# Patient Record
Sex: FEMALE | Race: BLACK OR AFRICAN AMERICAN | NOT HISPANIC OR LATINO | Employment: FULL TIME | URBAN - METROPOLITAN AREA
[De-identification: names, ages, dates, MRNs, and addresses within clinical notes are randomized per-mention and may not be internally consistent; named-entity substitution may affect disease eponyms.]

---

## 2017-01-04 PROCEDURE — 0KQM0ZZ REPAIR PERINEUM MUSCLE, OPEN APPROACH: ICD-10-PCS | Performed by: OBSTETRICS & GYNECOLOGY

## 2017-01-14 ENCOUNTER — HOSPITAL ENCOUNTER (INPATIENT)
Facility: HOSPITAL | Age: 22
LOS: 2 days | Discharge: HOME | End: 2017-01-16
Attending: OBSTETRICS & GYNECOLOGY | Admitting: OBSTETRICS & GYNECOLOGY
Payer: MEDICAID

## 2017-01-14 ENCOUNTER — ANESTHESIA (OUTPATIENT)
Dept: OBSTETRICS AND GYNECOLOGY | Facility: HOSPITAL | Age: 22
End: 2017-01-14
Payer: MEDICAID

## 2017-01-14 ENCOUNTER — ANESTHESIA EVENT (OUTPATIENT)
Dept: OBSTETRICS AND GYNECOLOGY | Facility: HOSPITAL | Age: 22
End: 2017-01-14
Payer: MEDICAID

## 2017-01-14 DIAGNOSIS — O47.9: ICD-10-CM

## 2017-01-14 DIAGNOSIS — Z34.93 PREGNANCY WITH ONE FETUS, THIRD TRIMESTER: Primary | ICD-10-CM

## 2017-01-14 LAB
ABO + RH BLD: NORMAL
ALBUMIN SERPL BCP-MCNC: 2.3 G/DL
ALP SERPL-CCNC: 220 U/L
ALT SERPL W/O P-5'-P-CCNC: 13 U/L
ANION GAP SERPL CALC-SCNC: 8 MMOL/L
ANISOCYTOSIS BLD QL SMEAR: SLIGHT
AST SERPL-CCNC: 11 U/L
BASOPHILS # BLD AUTO: 0.01 K/UL
BASOPHILS NFR BLD: 0.1 %
BILIRUB SERPL-MCNC: 0.2 MG/DL
BLD GP AB SCN CELLS X3 SERPL QL: NORMAL
BUN SERPL-MCNC: 4 MG/DL
CALCIUM SERPL-MCNC: 8.7 MG/DL
CHLORIDE SERPL-SCNC: 108 MMOL/L
CO2 SERPL-SCNC: 20 MMOL/L
CREAT SERPL-MCNC: 0.6 MG/DL
CREAT UR-MCNC: 40.5 MG/DL
DIFFERENTIAL METHOD: ABNORMAL
EOSINOPHIL # BLD AUTO: 0 K/UL
EOSINOPHIL NFR BLD: 0.2 %
ERYTHROCYTE [DISTWIDTH] IN BLOOD BY AUTOMATED COUNT: 19.4 %
EST. GFR  (AFRICAN AMERICAN): >60 ML/MIN/1.73 M^2
EST. GFR  (NON AFRICAN AMERICAN): >60 ML/MIN/1.73 M^2
GLUCOSE SERPL-MCNC: 91 MG/DL
HCT VFR BLD AUTO: 30.8 %
HGB BLD-MCNC: 9.5 G/DL
HYPOCHROMIA BLD QL SMEAR: ABNORMAL
LYMPHOCYTES # BLD AUTO: 1.4 K/UL
LYMPHOCYTES NFR BLD: 12.4 %
MCH RBC QN AUTO: 19.8 PG
MCHC RBC AUTO-ENTMCNC: 30.8 %
MCV RBC AUTO: 64 FL
MONOCYTES # BLD AUTO: 0.3 K/UL
MONOCYTES NFR BLD: 2.3 %
NEUTROPHILS # BLD AUTO: 9.8 K/UL
NEUTROPHILS NFR BLD: 85.3 %
OVALOCYTES BLD QL SMEAR: ABNORMAL
PLATELET # BLD AUTO: 229 K/UL
PMV BLD AUTO: ABNORMAL FL
POIKILOCYTOSIS BLD QL SMEAR: SLIGHT
POTASSIUM SERPL-SCNC: 3.7 MMOL/L
PROT SERPL-MCNC: 6.5 G/DL
PROT UR-MCNC: 19 MG/DL
PROT/CREAT RATIO, UR: 0.47
RBC # BLD AUTO: 4.79 M/UL
SODIUM SERPL-SCNC: 136 MMOL/L
WBC # BLD AUTO: 11.53 K/UL

## 2017-01-14 PROCEDURE — 59409 OBSTETRICAL CARE: CPT | Mod: AA,,, | Performed by: ANESTHESIOLOGY

## 2017-01-14 PROCEDURE — 25000003 PHARM REV CODE 250: Performed by: OBSTETRICS & GYNECOLOGY

## 2017-01-14 PROCEDURE — 86592 SYPHILIS TEST NON-TREP QUAL: CPT

## 2017-01-14 PROCEDURE — 11000001 HC ACUTE MED/SURG PRIVATE ROOM

## 2017-01-14 PROCEDURE — 51702 INSERT TEMP BLADDER CATH: CPT

## 2017-01-14 PROCEDURE — 82570 ASSAY OF URINE CREATININE: CPT

## 2017-01-14 PROCEDURE — 63600175 PHARM REV CODE 636 W HCPCS: Performed by: OBSTETRICS & GYNECOLOGY

## 2017-01-14 PROCEDURE — 63600175 PHARM REV CODE 636 W HCPCS: Performed by: ANESTHESIOLOGY

## 2017-01-14 PROCEDURE — 72200004 HC VAGINAL DELIVERY LEVEL I

## 2017-01-14 PROCEDURE — 25000003 PHARM REV CODE 250: Performed by: ANESTHESIOLOGY

## 2017-01-14 PROCEDURE — 27800517 HC TRAY,EPIDURAL-CONTINUOUS: Performed by: ANESTHESIOLOGY

## 2017-01-14 PROCEDURE — 86900 BLOOD TYPING SEROLOGIC ABO: CPT

## 2017-01-14 PROCEDURE — 27200710 HC EPIDURAL INFUSION PUMP SET: Performed by: ANESTHESIOLOGY

## 2017-01-14 PROCEDURE — 63600175 PHARM REV CODE 636 W HCPCS

## 2017-01-14 PROCEDURE — 86901 BLOOD TYPING SEROLOGIC RH(D): CPT

## 2017-01-14 PROCEDURE — 72100002 HC LABOR CARE, 1ST 8 HOURS

## 2017-01-14 PROCEDURE — 80053 COMPREHEN METABOLIC PANEL: CPT

## 2017-01-14 PROCEDURE — 85025 COMPLETE CBC W/AUTO DIFF WBC: CPT

## 2017-01-14 PROCEDURE — 62326 NJX INTERLAMINAR LMBR/SAC: CPT | Performed by: ANESTHESIOLOGY

## 2017-01-14 RX ORDER — OXYCODONE AND ACETAMINOPHEN 5; 325 MG/1; MG/1
1 TABLET ORAL EVERY 4 HOURS PRN
Status: DISCONTINUED | OUTPATIENT
Start: 2017-01-14 | End: 2017-01-16 | Stop reason: HOSPADM

## 2017-01-14 RX ORDER — FENTANYL CITRATE 50 UG/ML
INJECTION, SOLUTION INTRAMUSCULAR; INTRAVENOUS
Status: DISCONTINUED | OUTPATIENT
Start: 2017-01-14 | End: 2017-01-14

## 2017-01-14 RX ORDER — OXYTOCIN/RINGER'S LACTATE 20/1000 ML
34 PLASTIC BAG, INJECTION (ML) INTRAVENOUS CONTINUOUS
Status: DISCONTINUED | OUTPATIENT
Start: 2017-01-14 | End: 2017-01-14

## 2017-01-14 RX ORDER — BUTORPHANOL TARTRATE 2 MG/ML
1 INJECTION INTRAMUSCULAR; INTRAVENOUS EVERY 4 HOURS PRN
Status: DISCONTINUED | OUTPATIENT
Start: 2017-01-14 | End: 2017-01-14

## 2017-01-14 RX ORDER — OXYCODONE AND ACETAMINOPHEN 10; 325 MG/1; MG/1
1 TABLET ORAL EVERY 4 HOURS PRN
Status: DISCONTINUED | OUTPATIENT
Start: 2017-01-14 | End: 2017-01-16 | Stop reason: HOSPADM

## 2017-01-14 RX ORDER — FENTANYL/BUPIVACAINE/NS/PF 2MCG/ML-.1
PLASTIC BAG, INJECTION (ML) INJECTION CONTINUOUS
Status: DISCONTINUED | OUTPATIENT
Start: 2017-01-14 | End: 2017-01-14

## 2017-01-14 RX ORDER — METOCLOPRAMIDE HYDROCHLORIDE 5 MG/ML
10 INJECTION INTRAMUSCULAR; INTRAVENOUS ONCE
Status: DISCONTINUED | OUTPATIENT
Start: 2017-01-14 | End: 2017-01-14

## 2017-01-14 RX ORDER — BUPIVACAINE HYDROCHLORIDE 2.5 MG/ML
INJECTION, SOLUTION EPIDURAL; INFILTRATION; INTRACAUDAL
Status: DISCONTINUED | OUTPATIENT
Start: 2017-01-14 | End: 2017-01-14

## 2017-01-14 RX ORDER — MISOPROSTOL 200 UG/1
600 TABLET ORAL
Status: DISCONTINUED | OUTPATIENT
Start: 2017-01-14 | End: 2017-01-16 | Stop reason: HOSPADM

## 2017-01-14 RX ORDER — AMOXICILLIN 250 MG
1 CAPSULE ORAL NIGHTLY
Status: DISCONTINUED | OUTPATIENT
Start: 2017-01-14 | End: 2017-01-16 | Stop reason: HOSPADM

## 2017-01-14 RX ORDER — OXYTOCIN/RINGER'S LACTATE 20/1000 ML
41.65 PLASTIC BAG, INJECTION (ML) INTRAVENOUS CONTINUOUS
Status: DISPENSED | OUTPATIENT
Start: 2017-01-14 | End: 2017-01-15

## 2017-01-14 RX ORDER — MISOPROSTOL 200 UG/1
400 TABLET ORAL EVERY 4 HOURS PRN
Status: DISCONTINUED | OUTPATIENT
Start: 2017-01-14 | End: 2017-01-16 | Stop reason: HOSPADM

## 2017-01-14 RX ORDER — SODIUM CITRATE AND CITRIC ACID MONOHYDRATE 334; 500 MG/5ML; MG/5ML
30 SOLUTION ORAL ONCE
Status: DISCONTINUED | OUTPATIENT
Start: 2017-01-14 | End: 2017-01-14

## 2017-01-14 RX ORDER — ONDANSETRON 8 MG/1
8 TABLET, ORALLY DISINTEGRATING ORAL EVERY 8 HOURS PRN
Status: DISCONTINUED | OUTPATIENT
Start: 2017-01-14 | End: 2017-01-16 | Stop reason: HOSPADM

## 2017-01-14 RX ORDER — CEFAZOLIN SODIUM 1 G/3ML
2 INJECTION, POWDER, FOR SOLUTION INTRAMUSCULAR; INTRAVENOUS
Status: DISCONTINUED | OUTPATIENT
Start: 2017-01-14 | End: 2017-01-14 | Stop reason: CLARIF

## 2017-01-14 RX ORDER — DOCUSATE SODIUM 100 MG/1
200 CAPSULE, LIQUID FILLED ORAL 2 TIMES DAILY PRN
Status: DISCONTINUED | OUTPATIENT
Start: 2017-01-14 | End: 2017-01-16 | Stop reason: HOSPADM

## 2017-01-14 RX ORDER — ZOLPIDEM TARTRATE 5 MG/1
5 TABLET ORAL NIGHTLY PRN
Status: DISCONTINUED | OUTPATIENT
Start: 2017-01-14 | End: 2017-01-16 | Stop reason: HOSPADM

## 2017-01-14 RX ORDER — OXYTOCIN 10 [USP'U]/ML
INJECTION, SOLUTION INTRAMUSCULAR; INTRAVENOUS
Status: COMPLETED
Start: 2017-01-14 | End: 2017-01-14

## 2017-01-14 RX ORDER — HYDROCORTISONE 25 MG/G
CREAM TOPICAL 3 TIMES DAILY PRN
Status: DISCONTINUED | OUTPATIENT
Start: 2017-01-14 | End: 2017-01-16 | Stop reason: HOSPADM

## 2017-01-14 RX ORDER — SIMETHICONE 80 MG
1 TABLET,CHEWABLE ORAL EVERY 6 HOURS PRN
Status: DISCONTINUED | OUTPATIENT
Start: 2017-01-14 | End: 2017-01-16 | Stop reason: HOSPADM

## 2017-01-14 RX ORDER — OXYTOCIN 10 [USP'U]/ML
20 INJECTION, SOLUTION INTRAMUSCULAR; INTRAVENOUS ONCE
Status: COMPLETED | OUTPATIENT
Start: 2017-01-14 | End: 2017-01-14

## 2017-01-14 RX ORDER — DIPHENHYDRAMINE HCL 25 MG
25 CAPSULE ORAL EVERY 4 HOURS PRN
Status: DISCONTINUED | OUTPATIENT
Start: 2017-01-14 | End: 2017-01-16 | Stop reason: HOSPADM

## 2017-01-14 RX ORDER — CEFAZOLIN SODIUM 2 G/50ML
2 SOLUTION INTRAVENOUS
Status: DISCONTINUED | OUTPATIENT
Start: 2017-01-14 | End: 2017-01-14

## 2017-01-14 RX ORDER — IBUPROFEN 600 MG/1
600 TABLET ORAL EVERY 6 HOURS
Status: DISCONTINUED | OUTPATIENT
Start: 2017-01-14 | End: 2017-01-16 | Stop reason: HOSPADM

## 2017-01-14 RX ORDER — SODIUM CHLORIDE, SODIUM LACTATE, POTASSIUM CHLORIDE, CALCIUM CHLORIDE 600; 310; 30; 20 MG/100ML; MG/100ML; MG/100ML; MG/100ML
INJECTION, SOLUTION INTRAVENOUS CONTINUOUS
Status: DISCONTINUED | OUTPATIENT
Start: 2017-01-14 | End: 2017-01-14

## 2017-01-14 RX ORDER — FAMOTIDINE 10 MG/ML
20 INJECTION INTRAVENOUS ONCE
Status: DISCONTINUED | OUTPATIENT
Start: 2017-01-14 | End: 2017-01-14

## 2017-01-14 RX ORDER — ACETAMINOPHEN 325 MG/1
650 TABLET ORAL EVERY 6 HOURS PRN
Status: DISCONTINUED | OUTPATIENT
Start: 2017-01-14 | End: 2017-01-16 | Stop reason: HOSPADM

## 2017-01-14 RX ADMIN — SODIUM CHLORIDE, SODIUM LACTATE, POTASSIUM CHLORIDE, AND CALCIUM CHLORIDE: .6; .31; .03; .02 INJECTION, SOLUTION INTRAVENOUS at 10:01

## 2017-01-14 RX ADMIN — OXYTOCIN 20 UNITS: 10 INJECTION, SOLUTION INTRAMUSCULAR; INTRAVENOUS at 03:01

## 2017-01-14 RX ADMIN — Medication 10 ML/HR: at 12:01

## 2017-01-14 RX ADMIN — IBUPROFEN 600 MG: 600 TABLET, FILM COATED ORAL at 11:01

## 2017-01-14 RX ADMIN — BUTORPHANOL TARTRATE 1 MG: 2 INJECTION, SOLUTION INTRAMUSCULAR; INTRAVENOUS at 10:01

## 2017-01-14 RX ADMIN — BUPIVACAINE HYDROCHLORIDE 5 ML: 2.5 INJECTION, SOLUTION EPIDURAL; INFILTRATION; INTRACAUDAL; PERINEURAL at 12:01

## 2017-01-14 RX ADMIN — SODIUM CHLORIDE, SODIUM LACTATE, POTASSIUM CHLORIDE, AND CALCIUM CHLORIDE 1000 ML: .6; .31; .03; .02 INJECTION, SOLUTION INTRAVENOUS at 11:01

## 2017-01-14 RX ADMIN — Medication 41.65 MILLI-UNITS/MIN: at 05:01

## 2017-01-14 RX ADMIN — IBUPROFEN 600 MG: 600 TABLET, FILM COATED ORAL at 06:01

## 2017-01-14 RX ADMIN — Medication 333 MILLI-UNITS/MIN: at 03:01

## 2017-01-14 RX ADMIN — PROMETHAZINE HYDROCHLORIDE 12.5 MG: 25 INJECTION INTRAMUSCULAR; INTRAVENOUS at 10:01

## 2017-01-14 RX ADMIN — FENTANYL CITRATE 100 MCG: 50 INJECTION INTRAMUSCULAR; INTRAVENOUS at 12:01

## 2017-01-14 RX ADMIN — FENTANYL CITRATE 100 MCG: 50 INJECTION INTRAMUSCULAR; INTRAVENOUS at 03:01

## 2017-01-14 RX ADMIN — DOCUSATE SODIUM AND SENNOSIDES 1 TABLET: 8.6; 5 TABLET, FILM COATED ORAL at 09:01

## 2017-01-14 NOTE — ANESTHESIA PROCEDURE NOTES
Epidural    Patient location during procedure: OB   Reason for block: primary anesthetic   Diagnosis: Active Labor   Start time: 1/14/2017 12:12 PM  Timeout: 1/14/2017 12:08 PM  End time: 1/14/2017 12:18 PM  Surgery related to: Vaginal Delivery  Staffing  Anesthesiologist: NIKHIL BAHENA  Performed by: anesthesiologist   Preanesthetic Checklist  Completed: patient identified, site marked, surgical consent, pre-op evaluation, timeout performed, IV checked, risks and benefits discussed, monitors and equipment checked, anesthesia consent given, hand hygiene performed and patient being monitored  Preparation  Patient position: sitting  Prep: ChloraPrep  Patient monitoring: Pulse Ox and Blood Pressure  Epidural  Skin Anesthetic: lidocaine 1%  Skin Wheal: 3 mL  Administration type: continuous  Approach: midline  Interspace: L3-4  Injection technique: ANTHONY saline  Needle and Epidural Catheter  Needle type: Tuohy   Needle gauge: 17  Needle length: 3.5 inches  Needle insertion depth: 8 cm  Catheter type: springwound and multi-orifice  Catheter size: 19 G  Catheter at skin depth: 12 cm  Test dose: 3 mL of lidocaine 1.5% with Epi 1-to-200,000  Additional Documentation: incremental injection, negative aspiration for heme and CSF, no paresthesia on injection, no signs/symptoms of IV or SA injection, no significant pain on injection and no significant complaints from patient  Needle localization: anatomical landmarks  Medications:  Bolus administered: 10 mL of 0.25% bupivacaine  Epinephrine added: none  Opioid administered: 100 mcg of   fentanyl  Volume per aspiration: 5 mL  Time between aspirations: 5 minutes  Assessment  Ease of block: easy  Patient's tolerance of the procedure: comfortable throughout block and no complaints

## 2017-01-14 NOTE — IP AVS SNAPSHOT
[address] [place] [address]  Phone: [phone]           I have received a copy of my After Visit Summary and discharge instructions from [facility].    INSTRUCTIONS RECEIVED AND UNDERSTOOD BY:                     Patient/Patient Representative: ________________________________________________________________     Date/Time: ________________________________________________________________                     Instructions Given By: ________________________________________________________________     Date/Time: ________________________________________________________________

## 2017-01-14 NOTE — ANESTHESIA PREPROCEDURE EVALUATION
01/14/2017  Jessica Welch is a 21 y.o., female.    [facility] Anesthesia Evaluation    I have reviewed the Patient Summary Reports.     I have reviewed the Medications.     Review of Systems  Anesthesia Hx:  No problems with previous Anesthesia Denies Hx of Anesthetic complications  Denies Family Hx of Anesthesia complications.   Denies Personal Hx of Anesthesia complications.   Social:  No Alcohol Use, Non-Smoker    Hematology/Oncology:  Hematology Normal   Oncology Normal   Hematology Comments: Alpha thalassemia minor, asymptomatic    EENT/Dental:EENT/Dental Normal   Cardiovascular:  Cardiovascular Normal Exercise tolerance: good     Pulmonary:   Asthma    Renal/:  Renal/ Normal     Hepatic/GI:  Hepatic/GI Normal    Neurological:   Headaches    Endocrine:  Endocrine Normal    Psych:  Psychiatric Normal           Physical Exam  General:  Well nourished    Airway/Jaw/Neck:  Airway Findings: Mouth Opening: Normal Tongue: Normal  General Airway Assessment: Adult, Average  Mallampati: II  TM Distance: Normal, at least 6 cm  Jaw/Neck Findings:  Neck ROM: Normal ROM      Dental:  Dental Findings: In tact   Chest/Lungs:  Chest/Lungs Findings: Clear to auscultation     Heart/Vascular:  Heart Findings: Rhythm: Regular Rhythm        Mental Status:  Mental Status Findings:  Alert and Oriented, Cooperative         Anesthesia Plan  Type of Anesthesia, risks & benefits discussed:  Anesthesia Type:  epidural  Patient's Preference:   Intra-op Monitoring Plan: standard ASA monitors  Intra-op Monitoring Plan Comments:   Post Op Pain Control Plan:   Post Op Pain Control Plan Comments:   Induction:    Beta Blocker:  Patient is not currently on a Beta-Blocker (No further documentation required).       Informed Consent: Patient understands risks and agrees with Anesthesia plan.  Questions answered. Anesthesia consent signed  with patient.  ASA Score: 2     Day of Surgery Review of History & Physical:    H&P update referred to the provider.         Ready For Surgery From Anesthesia Perspective.

## 2017-01-14 NOTE — TREATMENT PLAN
Recovery complete.  Moving right leg well, left leg slightly.  Regular diet served.  Pain 0/10.  Firm at umb with small amt rubra lochia noted.  Marilyn care done.

## 2017-01-14 NOTE — L&D DELIVERY NOTE
Delivery Note    Obstetrician:  Jozef    Pre-Delivery Diagnosis: Term pregnancy or Spontaneous labor    Post-Delivery Diagnosis: Living  infant(s) or Male    Procedure: Spontaneous vaginal delivery    Episiotomy or Incision: 2 degree laceration repaired    Indications for instrumental delivery: none    Infant Wt:    Pending     Apgars: 1' 9  /  5' 9     Placenta and Cord:          Spontaneous and intact    Estimated Blood Loss:  600- atony responded to extra pitocin and massage           Complications:  None             Condition: Mom and baby in good condition

## 2017-01-14 NOTE — IP AVS SNAPSHOT
[place] [address] [place] [address]  Phone: [phone]           Patient Discharge Instructions     Our goal is to set you up for success. This packet includes information on your condition, medications, and your home care. It will help you to care for yourself so you don't get sicker and need to go back to the hospital.     Please ask your nurse if you have any questions.        There are many details to remember when preparing to leave the hospital. Here is what you will need to do:    1. Take your medicine. If you are prescribed medications, review your Medication List in the following pages. You may have new medications to  at the pharmacy and others that you'll need to stop taking. Review the instructions for how and when to take your medications. Talk with your doctor or nurses if you are unsure of what to do.     2. Go to your follow-up appointments. Specific follow-up information is listed in the following pages. Your may be contacted by a transition nurse or clinical provider about future appointments. Be sure we have all of the phone numbers to reach you, if needed. Please contact your provider's office if you are unable to make an appointment.     3. Watch for warning signs. Your doctor or nurse will give you detailed warning signs to watch for and when to call for assistance. These instructions may also include educational information about your condition. If you experience any of warning signs to your health, call your doctor.               ** Verify the list of medication(s) below is accurate and up to date. Carry this with you in case of emergency. If your medications have changed, please notify your healthcare provider.             Medication List      START taking these medications        Additional Info                      ibuprofen 600 MG tablet   Commonly known as:  ADVIL,MOTRIN   Quantity:  30 tablet   Refills:  0   Dose:  600 mg    Last time this  was given:  600 mg on 1/16/2017 11:50 AM   Instructions:  Take 1 tablet (600 mg total) by mouth every 6 (six) hours as needed for Pain.     Begin Date    AM    Noon    PM    Bedtime         CHANGE how you take these medications        Additional Info                      ferrous sulfate 325 mg (65 mg iron) Tab tablet   Quantity:  90 tablet   Refills:  2   Dose:  325 mg   What changed:  when to take this    Instructions:  Take 1 tablet (325 mg total) by mouth 3 (three) times daily.     Begin Date    AM    Noon    PM    Bedtime         CONTINUE taking these medications        Additional Info                      butalbital-acetaminophen-caffeine -40 mg -40 mg per tablet   Commonly known as:  FIORICET   Quantity:  30 tablet   Refills:  0   Dose:  1-2 tablet    Instructions:  Take 1-2 tablets by mouth every 6 (six) hours as needed for Pain.     Begin Date    AM    Noon    PM    Bedtime       famotidine 40 MG tablet   Commonly known as:  PEPCID   Quantity:  30 tablet   Refills:  1   Dose:  40 mg    Instructions:  Take 1 tablet (40 mg total) by mouth nightly as needed for Heartburn.     Begin Date    AM    Noon    PM    Bedtime       prenatal vit#112-folic acid 1 mg Chew   Quantity:  30 tablet   Refills:  9   Dose:  1 tablet    Instructions:  Take 1 tablet by mouth once daily.     Begin Date    AM    Noon    PM    Bedtime       promethazine 25 MG tablet   Commonly known as:  PHENERGAN   Refills:  4    Instructions:  TK ONE PO Q 6 H PRN FOR NAUSEA     Begin Date    AM    Noon    PM    Bedtime            Where to Get Your Medications      These medications were sent to [facility] [id number] [place] [address] [place] [address] AT [facility] [place] [address] [address] [facility] [address]    Hours:  24-hours Phone:  [phone]     ferrous sulfate 325 mg (65 mg iron) Tab tablet    ibuprofen 600 MG tablet                  Please bring to all follow up appointments:    1. A copy of  your discharge instructions.  2. All medicines you are currently taking in their original bottles.  3. Identification and insurance card.    Please arrive 15 minutes ahead of scheduled appointment time.    Please call 24 hours in advance if you must reschedule your appointment and/or time.        Your Scheduled Appointments     Jan 18, 2017  3:30 PM CST   Repeat OB Visit-OCC with Piotr Wisdom MD   [facility] (OCC)    [address] [facility] [id number] [phone]              Follow-up Information     Follow up with Haily Haskins MD In 6 weeks.    Specialty:  Obstetrics and Gynecology    Why:  for postpartum check    Contact information:    [address] [place] [address] [address]  [address]  [facility] [address]  [phone]          Discharge Instructions     Future Orders    Diet general     Questions:    Total calories:      Fat restriction, if any:      Protein restriction, if any:      Na restriction, if any:      Fluid restriction:      Additional restrictions:          Discharge Instructions       After a Vaginal Birth    General Discharge Instructions  · May follow a regular diet, unless otherwise discussed with physician.  · Take showers, not baths unless otherwise discussed with physician.  · Activity as tolerated.  · No lifting or heavy exercise for 6 weeks, no driving for 2 weeks, no sexual intercourse, douching or tampons for 6 weeks  · May return to work/school as discussed with physician  · Discuss birth control with physician  · Breast care support bra worn at all times  · Lactation consultant referral number ( [phone] or [phone])    Call Your Healthcare Provider Right Away If You Have:  · A fever of 101°F or higher.  · Heavy vaginal bleeding, clots, or vaginal discharge with foul odor.  · Redness, discharge, or pain worse than you had in the hospital.  · Burning, pain, red streaks, or lumpy areas in your breasts.  · Cracks, blisters, or blood on your nipples.  · Burning or pain when  you urinate.  · Persistent nausea or vomiting.  · Severe headaches, blurred vision, dizziness or fainting.  · Feelings of extreme sadness or anxiety, or a feeling that you dont want to be with your baby.  · No bowel movement for 5 days.  · Redness, warmth, swelling, or pain in the lower leg.      If You Had Stitches  You may have received stitches in the skin near your vagina. The stitches might have closed an episiotomy (an incision that enlarges the opening of the vagina). Or you may have needed stitches to repair torn skin. Either way, your stitches should dissolve within weeks. Until then, you can help reduce discomfort, aid healing, and reduce your risk of infection by keeping the stitches clean. These tips can help:  · Gently wipe from front to back after you urinate or have a bowel movement.  · After wiping, spray warm water on the area. Or you can have a sitz bath. This means sitting in a tub with a few inches of water in it. Then pat the area dry or use a hairdryer on a cool setting.  · Do not use soap or any solution except water on the area.  · You can take a shower unless told not to.  · Change sanitary pads at least every 2-4 hours.  · Place cold or heat packs on the area as directed by your doctors or nurses. Keep a thin towel between the pack and your skin.  · Sit on firm seats so the stitches pull less.       Follow-Up  Schedule a  follow-up exam with your healthcare provider for about 6 weeks after delivery. During this exam, your uterus and vaginal area will be checked. Contact your healthcare provider if you think you or your baby are having any problems.    After a Vaginal Birth   After having a baby, your body may be very tired. It can take time to recover from a vaginal delivery. You may stay in the hospital or birth center from 1 to 4 days. In some cases, you may be able to go home the same day.       Right after the delivery   Your temperature and blood pressure will be  taken until they are stable. A nurse or other healthcare provider will observe you as you rest. You may have afterbirth pains. These are cramps caused by the uterus shrinking. Sanitary pads are used to absorb the discharge of the uterine lining. To ensure that you arent bleeding too much, the pad will be checked. And the firmness of your uterus will be checked. To do this, a nurse will gently push down on your abdomen. If you had anesthesia, youll be watched closely until you can feel and move your toes. If you have perineal pain (pain between the vagina and anus), an ice pack can help.   [place] care   While still in the hospital or birth center, youll learn how to hold and feed your baby. You will also be given instructions on how to care for your baby. This includes bathing and feeding.   Preparing to go home   You may be anxious to go home as soon as possible. Before you and your baby go home, a healthcare provider will check to be sure you are healthy enough to take care of your baby and yourself. Youre ready to go home when:   You can walk to the bathroom without help.   You can eat solid food and swallow pills (if needed).   You have no sign of infection or other health problems, including fever.   Before leaving the hospital or birth center, youll be given written instructions for home self-care after vaginal delivery. Be sure to follow these instructions carefully. If you have questions or concerns, talk about them now.   If you had stitches   You may have received stitches in the skin near your vagina. The stitches might have closed an episiotomy (an incision that enlarges the opening of the vagina). Or you may have needed stitches to repair torn skin. Either way, your stitches should dissolve within weeks. Until then, you can help reduce discomfort, aid healing, and reduce your risk of infection by keeping the stitches clean. These tips can help:   Gently wipe from front to back after you urinate or  have a bowel movement.   After wiping, spray warm water on the area. Or you can have a sitz bath. This means sitting in a tub with a few inches of water in it. Then pat the area dry or use a hairdryer on a cool setting.   Do not use soap or any solution except water on the area.   You can take a shower unless told not to.   Change sanitary pads at least every 2-4 hours.   Place cold or heat packs on the area as directed by your doctors or nurses. Keep a thin towel between the pack and your skin.   Sit on firm seats so the stitches pull less.   follow-up   Schedule a  follow-up exam with your health care provider for about 6 weeks after delivery. During this exam, your uterus and vaginal area will be checked. Contact your health care provider if you think you or your baby are having any problems.       After Delivery: When to Call the Health Care Provider   Health problems sometimes arise with you or your baby following delivery. Call your baby's health care provider or your health care provider if you see any of the signs below.       Watch your baby for these signs   Call your babys health care provider if your baby:   Has a rectal temperature of 100.4°F (38°C) or higher   Has fewer than 6 wet diapers a day (Hint: Disposable diapers may feel heavy or hard after being soaked.)   Skin or whites of the eyes appear yellow   Cries for a long time, or if it sounds as if the cries are caused by pain   Has diarrhea   Refuses two feedings in a row   Is inactive or listless   Is vomiting   Has blood in the stool or vomit   Has a rash   Has ear drainage   Has difficulty breathing   Has a seizure   Will not wake up  Trust your instincts. If you are concerned about your baby, call your health care provider.   Watch your own health for these signs   Call your own health care provider if you have:   Burning or pain in your breasts   Red streaks or hard lumpy areas in your breasts   Problems with breast feeding    A fever of 100.4°F (38°C) or higher   Extreme tiredness or body aches, as if you have the flu   Feelings of extreme sadness or anxiety, or a feeling that you dont want to be with your baby   Abdominal pain that isnt relieved with medicine   Vaginal discharge that has a bad odor   Vaginal bleeding that soaks more than one pad per hour   If you had a  section, call for concerns about your incision site such as pain, drainage or bleeding from your incision      Incision Care After Vaginal Birth   After your babys birth, you may have needed stitches in the skin near your vagina. The stitches might have closed an episiotomy (an incision that enlarges the opening of the vagina). Or you may have needed stitches to repair torn skin. Either way, your stitches should dissolve within weeks. Until then, use this handout as a guide to help ease any discomfort and aid healing.       Keep Clean   You can reduce your risk of infection by keeping the area around the stitches clean. These hints can help:   Gently wipe from front to back after you urinate or have a bowel movement.   After wiping, spray warm water on the stitches. Pat dry. If you are too sore, just spray the area after urination and then pat dry without wiping. If you are too sore, just spray the area after urination and then pat dry without wiping.   Do not use soap or any solution except water unless instructed by your health care provider.   Change sanitary pads at least every 2-4 hours.      Eat to Stay Regular   Having bowel movements is easier if youre not constipated. Follow these tips:   Eat fresh fruit and vegetables, whole grains, and bran cereals.   Drink plenty of water.   Dont strain to have a bowel movement.   Ask your health care provider about using a stool softener. If you are breastfeeding, ask before you take any medication.      Reduce Your Discomfort   Sit in a warm bath (sitz bath).   Place cold or heat packs on your stitches. Keep  a thin towel between the pack and your skin.   Sit on a firm seat so the stitches pull less.   Use medicated spray as ordered by your health care provider.  Call your doctor or health care provider if you have:   Repeated clots the size of a quarter or larger passing from the vagina   Heavy or gushing bleeding from the vagina   Discharge that has a bad odor   Severe pain in the abdomen or increased pain near your stitches   Fever or chills   No bowel movement within one week after the birth of your baby   Pain or urgency with urination, or inability to urinate        Breast Care After Birth   A few days after your babys birth, your breasts will swell with milk. They are likely to feel tender and heavy. This is normal. To help prevent breast soreness and control irritation, follow these tips:       Coping with swelling   Use cold compresses or an ice pack to help reduce the ache or pain.   Breastfeed often to keep milk from clogging your breast ducts.   If your nipples are flat from breast swelling, hand express some milk. Squeeze out a few drops of milk by massaging and compressing your breasts.   If you have swelling with pain or fever, call your health care provider.  Preventing sore nipples   Make sure baby latches on to your breast correctly. The babys tongue should always be under your nipple, and your entire areola should be in the baby's mouth.   You can let milk dry on your nipples. This dried milk can protect the skin on your nipple/breasts.   Do not use alcohol, soap, or scented cleansers on your breasts. These can cause the nipples to dry and crack.   Do not wear nursing pads that are lined with plastic. They hold in moisture and can cause chapping.   If you experience cracked or bleeding nipples, consult your doctor or a lactation consultant. He or she will ensure that your baby's latch is correct and may suggest topical treatment, such as pure lanolin.  Choosing a good bra   Wearing the right-sized  bra is especially important now. If a bra is too tight, it may cause a duct in your breast to clog and become irritated. If possible, have a salesperson help fit you for a new bra. Look for one thats 100% cotton and comfortable. Also, choose a bra with wide straps that wont dig into your back and shoulders. If youre breastfeeding, find a nursing bra that allows you to uncover one breast at a time.   If you are not breastfeeding:   Avoid stimulation of nipples   Wear a tight-fitting bra   Apply ice packs for discomfort                Primary Diagnosis     Your primary diagnosis was:  Normal Vaginal Delivery      Admission Information     Date & Time Provider Department CSN    1/14/2017  9:17 AM Piotr Wisdom MD [facility] [id number]      Care Providers     Provider Role Specialty Primary office phone    Piotr Wisdom MD Attending Provider Obstetrics and Gynecology [phone]      Your Vitals Were     BP Pulse Temp Resp Last Period SpO2    129/73 82 97.3 °F (36.3 °C) 16 04/09/2016 (Exact Date) 100%      Recent Lab Values        5/24/2016                          10:53 AM           A1C 97.9                       Allergies as of 1/16/2017     No Known Allergies      [facility]     [facility] Nurse Care Line - 24/7 Assistance  Unless otherwise directed by your provider, please contact [facility], our nurse care line that is available for 24/7 assistance.     Registered nurses in the [facility] provide clinical advisement, health education, appointment booking, and other advisory services.  Call for this free service at [phone].        Advance Directives     An advance directive is a document which, in the event you are no longer able to make decisions for yourself, tells your healthcare team what kind of treatment you do or do not want to receive, or who you would like to make those decisions for you.  If you do not currently have an advance directive, [facility]  encourages you to create one.  For more information call:  [phone] ([phone]), [phone] ([phone]),  or log on to [email or web address].        Language Assistance Services     ATTENTION: Language assistance services are available, free of charge. Please call [phone].      ATENCIÓN: Si habla gonzales, tiene a pappas disposición servicios gratuitos de asistencia lingüística. Llame al [phone].     [facility] Ý: N?u b?n nói Ti?ng Vi?t, có các d?ch v? h? tr? ngôn ng? mi?n phí dành cho b?n. G?i s? [phone].        [facility] Sign-Up     Activating your [facility] account is as easy as 1-2-3!     1) Visit [email or web address], select Sign Up Now, enter this activation code and your date of birth, then select Next.  [email or web address]  Expires: 2/13/2017  7:15 AM      2) Create a username and password to use when you visit [facility] in the future and select a security question in case you lose your password and select Next.    3) Enter your e-mail address and click Sign Up!    Additional Information  If you have questions, please e-mail [email or web address] or call [phone] to talk to our [facility] staff. Remember, [facility] is NOT to be used for urgent needs. For medical emergencies, dial 911.          [facility] complies with applicable Federal civil rights laws and does not discriminate on the basis of race, color, national origin, age, disability, or sex.

## 2017-01-14 NOTE — LACTATION NOTE
This note was copied from a baby's chart.     01/14/17 1621   Infant Assessment   Sucking Reflex present   Rooting Reflex present   Swallow Reflex present   LATCH Score   Latch 2-->grasps breast, tongue down, lips flanged, rhythmic sucking   Audible Swallowing 2-->spontaneous and intermittent (24 hrs old)   Type Of Nipple 2-->everted (after stimulation)   Comfort (Breast/Nipple) 2-->soft/nontender   Hold (Positioning) 2-->no assist from staff, mother able to position/hold infant   Score (less than 7 for 2/more consecutive times, consult Lactation Consultant) 10   Maternal Infant Feeding   Maternal Emotional State independent;relaxed   Infant Positioning cradle   Signs of Milk Transfer audible swallow;infant jaw motion present   Presence of Pain no   Time Spent (min) 0-15 min   Latch Assistance no   Breastfeeding History   Breastfeeding History no   Infant First Feeding   Infant First Feeding breastfeeding   Skin-to-Skin Contact Maintained   Skin-to-Skin Contact Following Delivery yes   Breastfeeding breastfeeding, right side only   Breastfeeding Right Side (min) 10 Min  (continues to breastfeed)   Feeding Infant   Feeding Readiness Cues sustained alertness;sucking motion present   Feeding Tolerance/Success alert for feeding;adequate pause for breath;strong suck;coordinated suck;coordinated swallow   Effective Latch During Feeding yes   Audible Swallow yes   Suck/Swallow Coordination present   Skin-to-Skin Contact During Feeding yes   Lactation Referrals   Lactation Consult Initial assessment;Knowledge deficit;Breastfeeding assessment   Lactation Interventions   Attachment Promotion breastfeeding assistance provided;counseling provided;environment adjusted;face-to-face positioning promoted;family involvement promoted;infant-mother separation minimized;privacy provided;role responsibility promoted;rooming-in promoted;skin-to-skin contact encouraged   Latch Promotion positioning assisted   Mother nursing on right  breast; Good latch noted; Mother denies pain or discomfort; Discussed breastfeeding basics with mother; Encouraged to feed on cue, 8 or more times in 24 hours; Encouraged to call lactation for needs or assistance; Verbalized understanding

## 2017-01-14 NOTE — TREATMENT PLAN
To room 223 per wheelchair with transport and family with complaint of rupture of water bag and labor.  Pain score 9/10.  Plan of care discussed at  0920.  reguitated on floor.  EFM applied at 0924. nitrazine negative.  Vag exam 2-3 cm , 80 %, -2.  Dr. blanton called with status at 0945 and orders for sedation received.

## 2017-01-14 NOTE — H&P
`History and Physical  Obstetrics      SUBJECTIVE:     Jessica Welch is a 21 y.o.  female at 39w0d  admitted for labor management.  Her current obstetrical history is significant for no issues.      PTA Medications   Medication Sig    butalbital-acetaminophen-caffeine -40 mg (FIORICET) -40 mg per tablet Take 1-2 tablets by mouth every 6 (six) hours as needed for Pain.    famotidine (PEPCID) 40 MG tablet Take 1 tablet (40 mg total) by mouth nightly as needed for Heartburn.    ferrous sulfate 325 mg (65 mg iron) Tab tablet Take 1 tablet (325 mg total) by mouth once daily.    prenatal vit#112-folic acid 1 mg Chew Take 1 tablet by mouth once daily.    promethazine (PHENERGAN) 25 MG tablet TK ONE PO Q 6 H PRN FOR NAUSEA       Review of patient's allergies indicates:  No Known Allergies     Past Medical History   Diagnosis Date    alpha thalassemia     Asthma     Headache, migraine      History reviewed. No pertinent past surgical history.  Family History   Problem Relation Age of Onset    Breast cancer Neg Hx     Colon cancer Neg Hx     Ovarian cancer Neg Hx      Social History   Substance Use Topics    Smoking status: Never Smoker    Smokeless tobacco: Never Used    Alcohol use No        OBJECTIVE:     Vital Signs (Most Recent)  Temp: 97.9 °F (36.6 °C) (17 1251)  Pulse: 67 (17 1349)  Resp: 16 (17 1306)  BP: 133/77 (17 1349)  SpO2: 100 % (17 1335)    Physical Exam:  General:  Normal, alert and oriented                       Abdomen: Soft gravid no FT   Uterine Size:  c/w dates   Presentations:  vertex   FHT: reviewed   Pelvis: NEFG   Cervix:     Dilation: 10 cm    Effacement: 100%    Station:  +2               Laboratory:  No results for input(s): ABORH, HEPBSAG, RUBELLAIGGSC, GBS, AFP, XZBYRKS0IH in the last 168 hours.   ASSESSMENT/PLAN:     39w0d gestation.  Active phase labor.   Conditions: N/A

## 2017-01-15 LAB
ANISOCYTOSIS BLD QL SMEAR: SLIGHT
BASOPHILS # BLD AUTO: 0.02 K/UL
BASOPHILS NFR BLD: 0.2 %
DIFFERENTIAL METHOD: ABNORMAL
EOSINOPHIL # BLD AUTO: 0 K/UL
EOSINOPHIL NFR BLD: 0.3 %
ERYTHROCYTE [DISTWIDTH] IN BLOOD BY AUTOMATED COUNT: 19.4 %
HCT VFR BLD AUTO: 25.3 %
HGB BLD-MCNC: 7.8 G/DL
HYPOCHROMIA BLD QL SMEAR: ABNORMAL
LYMPHOCYTES # BLD AUTO: 2.6 K/UL
LYMPHOCYTES NFR BLD: 22.6 %
MCH RBC QN AUTO: 19.5 PG
MCHC RBC AUTO-ENTMCNC: 30.8 %
MCV RBC AUTO: 63 FL
MONOCYTES # BLD AUTO: 0.5 K/UL
MONOCYTES NFR BLD: 4.1 %
NEUTROPHILS # BLD AUTO: 8.3 K/UL
NEUTROPHILS NFR BLD: 73.2 %
OVALOCYTES BLD QL SMEAR: ABNORMAL
PLATELET # BLD AUTO: 183 K/UL
PMV BLD AUTO: ABNORMAL FL
POIKILOCYTOSIS BLD QL SMEAR: SLIGHT
RBC # BLD AUTO: 3.99 M/UL
WBC # BLD AUTO: 11.35 K/UL

## 2017-01-15 PROCEDURE — 85025 COMPLETE CBC W/AUTO DIFF WBC: CPT

## 2017-01-15 PROCEDURE — 36415 COLL VENOUS BLD VENIPUNCTURE: CPT

## 2017-01-15 PROCEDURE — 25000003 PHARM REV CODE 250: Performed by: OBSTETRICS & GYNECOLOGY

## 2017-01-15 PROCEDURE — 11000001 HC ACUTE MED/SURG PRIVATE ROOM

## 2017-01-15 RX ADMIN — OXYCODONE HYDROCHLORIDE AND ACETAMINOPHEN 1 TABLET: 10; 325 TABLET ORAL at 10:01

## 2017-01-15 RX ADMIN — IBUPROFEN 600 MG: 600 TABLET, FILM COATED ORAL at 06:01

## 2017-01-15 RX ADMIN — IBUPROFEN 600 MG: 600 TABLET, FILM COATED ORAL at 12:01

## 2017-01-15 RX ADMIN — IBUPROFEN 600 MG: 600 TABLET, FILM COATED ORAL at 11:01

## 2017-01-15 RX ADMIN — OXYCODONE HYDROCHLORIDE AND ACETAMINOPHEN 1 TABLET: 10; 325 TABLET ORAL at 05:01

## 2017-01-15 RX ADMIN — IBUPROFEN 600 MG: 600 TABLET, FILM COATED ORAL at 05:01

## 2017-01-15 RX ADMIN — OXYCODONE HYDROCHLORIDE AND ACETAMINOPHEN 1 TABLET: 10; 325 TABLET ORAL at 06:01

## 2017-01-15 RX ADMIN — DOCUSATE SODIUM AND SENNOSIDES 1 TABLET: 8.6; 5 TABLET, FILM COATED ORAL at 09:01

## 2017-01-15 NOTE — PLAN OF CARE
Problem: Breastfeeding (Adult,Obstetrics,Pediatric)  Intervention: Promote Positive Maternal Experience  Pt breastfeeding well

## 2017-01-15 NOTE — PROGRESS NOTES
[facility]  Vaginal Delivery Progress Note  Obstetrics    SUBJECTIVE:     Jessica Welch is a 21 y.o. female PPD #1 status post Spontaneous vaginal delivery at 39w0d in a pregnancy complicated by meconium. Patient is doing  well this morning. She denies  nausea, vomiting, fever or chills. Patient reports no abdominal pain that is well relieved by  oral pain medications. Lochia is mild and decreasing. Patient is voiding without difficulty and ambulating with no difficulty. She has passed flatus and has had a BM. Patient does plan to breast feed.   for contraception. She  desires circumcision.    OBJECTIVE:     Vital Signs Ranges:  Temp:  [97.5 °F (36.4 °C)-98.5 °F (36.9 °C)] 97.5 °F (36.4 °C)  Pulse:  [] 77  Resp:  [16-22] 18  SpO2:  [90 %-100 %] 100 %  BP: (121-178)/(58-95) 143/77    I/O (Last 24H):    Intake/Output Summary (Last 24 hours) at 01/15/17 0746  Last data filed at 01/15/17 0400   Gross per 24 hour   Intake                0 ml   Output             2600 ml   Net            -2600 ml       Physical Exam:  General:    alert, appears stated age and cooperative   Lungs:  clear to auscultation bilaterally   Heart:  regular rate and rhythm, S1, S2 normal, no murmur, click, rub or gallop   Abdomen:  soft, non-tender; bowel sounds normal; no masses,  no organomegaly   Uterine Size:  firm located at the umbilicus.   Incision:  none   Extremities:   peripheral pulses normal, no pedal edema, no clubbing or cyanosis     [facility]   [facility]    ASSESSMENT:     Assessment:  Active Hospital Problems    Diagnosis    Labor, false ([place])      PLAN:     Plan:  1. Postpartum care:   - Patient doing well. Continue routine management and advances.   - Continue PO pain meds. Pain well controlled.   - Post  : H/h  .     - Encourage ambulation   - Circumcision     - Contraception    - Lactation      2. D/c tomorrow.  Will need to do circ tomorrow am.     Disposition: As patient meets  milestones, will plan to discharge  tomorrow.

## 2017-01-15 NOTE — TREATMENT PLAN
Pt up to bathroom with minimal assistance.  Voided 300cc on toilet. Teaching re abdi bottle. Pad and panties on.  Pt moved to room 209 via wc in stable condition.

## 2017-01-15 NOTE — ANESTHESIA POSTPROCEDURE EVALUATION
Anesthesia Post Evaluation    Patient: Jessica Welch    Procedure(s) Performed: * No procedures listed *    Final Anesthesia Type: epidural  Patient location during evaluation: labor & delivery  Patient participation: Yes- Able to Participate  Level of consciousness: awake and alert and oriented  Post-procedure vital signs: reviewed and stable  Pain management: adequate  Airway patency: patent  PONV status at discharge: No PONV  Anesthetic complications: no      Cardiovascular status: blood pressure returned to baseline and hemodynamically stable  Respiratory status: unassisted, spontaneous ventilation and room air  Hydration status: euvolemic  Follow-up not needed.        Visit Vitals    /75    Pulse 71    Temp 36.4 °C (97.5 °F) (Oral)    Resp 18    LMP 04/09/2016 (Exact Date)    SpO2 100%    Breastfeeding Unknown       Pain/Mackenzie Score: Pain Rating Prior to Med Admin: 3 (1/14/2017 11:51 PM)  Pain Rating Post Med Admin: 6 (1/14/2017 10:44 AM)

## 2017-01-15 NOTE — PLAN OF CARE
Problem: Breastfeeding (Adult,Obstetrics,Pediatric)  Goal: Signs and Symptoms of Listed Potential Problems Will be Absent, Minimized or Managed (Breastfeeding)  Signs and symptoms of listed potential problems will be absent, minimized or managed by discharge/transition of care (reference Breastfeeding (Adult,Obstetrics,Pediatric) CPG).   Outcome: Ongoing (interventions implemented as appropriate)  Breast feeding on cue, 8 or more times in 24 hours.  Call for assist prn.

## 2017-01-15 NOTE — LACTATION NOTE
"This note was copied from a baby's chart.     01/15/17 0755   Infant Assessment   Sucking Reflex present   Rooting Reflex present   Swallow Reflex present   LATCH Score   Latch 2-->grasps breast, tongue down, lips flanged, rhythmic sucking   Audible Swallowing 2-->spontaneous and intermittent (24 hrs old)   Type Of Nipple 2-->everted (after stimulation)   Comfort (Breast/Nipple) 2-->soft/nontender   Hold (Positioning) 1-->minimal assist, teach one side: mother does other, staff holds   Score (less than 7 for 2/more consecutive times, consult Lactation Consultant) 9   Maternal Infant Feeding   Maternal Emotional State assist needed;relaxed   Infant Positioning clutch/"football"   Signs of Milk Transfer audible swallow;infant jaw motion present   Presence of Pain no   Time Spent (min) 0-15 min   Latch Assistance yes   Breastfeeding Education adequate infant intake;adequate milk volume;importance of skin-to-skin contact   Infant First Feeding   Breastfeeding breastfeeding, left side only   Breastfeeding Left Side (min) (latched infant)   Feeding Infant   Feeding Readiness Cues rooting;smacking   Feeding Tolerance/Success adequate pause for breath;alert for feeding;coordinated suck;coordinated swallow;strong suck   Effective Latch During Feeding yes   Audible Swallow yes   Suck/Swallow Coordination present   Lactation Referrals   Lactation Consult Breastfeeding assessment;Follow up;Knowledge deficit   Lactation Interventions   Attachment Promotion breastfeeding assistance provided;counseling provided;environment adjusted;face-to-face positioning promoted;infant-mother separation minimized;family involvement promoted;privacy provided;role responsibility promoted;rooming-in promoted;skin-to-skin contact encouraged   Latch Promotion positioning assisted;infant moved to breast   Mother states infant is breastfeeding well; Denies pain or discomfort with feedings; Assisted mother with latching infant to left breast in " football hold; Educated on feeding cues and breastfeeding basics; Encouraged to feed on cue, at least 8 or more times in 24 hours; Lactation phone number provided; Encouraged to call for needs or assistance prn; Verbalized understanding

## 2017-01-16 VITALS
OXYGEN SATURATION: 100 % | SYSTOLIC BLOOD PRESSURE: 135 MMHG | RESPIRATION RATE: 14 BRPM | DIASTOLIC BLOOD PRESSURE: 77 MMHG | TEMPERATURE: 99 F | HEART RATE: 96 BPM

## 2017-01-16 LAB — RPR SER QL: NORMAL

## 2017-01-16 PROCEDURE — 63600175 PHARM REV CODE 636 W HCPCS: Performed by: OBSTETRICS & GYNECOLOGY

## 2017-01-16 PROCEDURE — 90471 IMMUNIZATION ADMIN: CPT | Performed by: OBSTETRICS & GYNECOLOGY

## 2017-01-16 PROCEDURE — 90715 TDAP VACCINE 7 YRS/> IM: CPT | Performed by: OBSTETRICS & GYNECOLOGY

## 2017-01-16 PROCEDURE — 25000003 PHARM REV CODE 250: Performed by: OBSTETRICS & GYNECOLOGY

## 2017-01-16 RX ORDER — FERROUS SULFATE 325(65) MG
325 TABLET ORAL 3 TIMES DAILY
Qty: 90 TABLET | Refills: 2 | Status: SHIPPED | OUTPATIENT
Start: 2017-01-16 | End: 2018-01-16

## 2017-01-16 RX ORDER — IBUPROFEN 600 MG/1
600 TABLET ORAL EVERY 6 HOURS PRN
Qty: 30 TABLET | Refills: 0 | Status: SHIPPED | OUTPATIENT
Start: 2017-01-16 | End: 2018-07-17 | Stop reason: SDUPTHER

## 2017-01-16 RX ADMIN — CLOSTRIDIUM TETANI TOXOID ANTIGEN (FORMALDEHYDE INACTIVATED), CORYNEBACTERIUM DIPHTHERIAE TOXOID ANTIGEN (FORMALDEHYDE INACTIVATED), BORDETELLA PERTUSSIS TOXOID ANTIGEN (GLUTARALDEHYDE INACTIVATED), BORDETELLA PERTUSSIS FILAMENTOUS HEMAGGLUTININ ANTIGEN (FORMALDEHYDE INACTIVATED), BORDETELLA PERTUSSIS PERTACTIN ANTIGEN, AND BORDETELLA PERTUSSIS FIMBRIAE 2/3 ANTIGEN 0.5 ML: 5; 2; 2.5; 5; 3; 5 INJECTION, SUSPENSION INTRAMUSCULAR at 11:01

## 2017-01-16 RX ADMIN — OXYCODONE HYDROCHLORIDE AND ACETAMINOPHEN 1 TABLET: 10; 325 TABLET ORAL at 04:01

## 2017-01-16 RX ADMIN — IBUPROFEN 600 MG: 600 TABLET, FILM COATED ORAL at 06:01

## 2017-01-16 RX ADMIN — IBUPROFEN 600 MG: 600 TABLET, FILM COATED ORAL at 11:01

## 2017-01-16 NOTE — LACTATION NOTE
01/16/17 0800   Maternal Infant Assessment   Breast Size Issue none   Breast Shape pendulous;round   Breast Density filling   Areola firm   Nipple(s) graspable   Infant Assessment   Tongue/Frenulum Symptoms appearance normal   Sucking Reflex present   Rooting Reflex present   Swallow Reflex present   LATCH Score   Latch 2-->grasps breast, tongue down, lips flanged, rhythmic sucking   Audible Swallowing 2-->spontaneous and intermittent (24 hrs old)   Type Of Nipple 2-->everted (after stimulation)   Comfort (Breast/Nipple) 2-->soft/nontender   Hold (Positioning) 2-->no assist from staff, mother able to position/hold infant   Score (less than 7 for 2/more consecutive times, consult Lactation Consultant) 10   Breasts WDL   Breasts WDL WDL   Maternal Infant Feeding   Maternal Emotional State independent;relaxed   Infant Positioning cross-cradle   Signs of Milk Transfer audible swallow   Presence of Pain no   Time Spent (min) 15-30 min   Comfort Measures Following Feeding air-drying encouraged;other (see comments)  (pt applied lanolin)   Breastfeeding Education importance of skin-to-skin contact;prenatal vitamins continued   Breastfeeding History   Currently Breastfeeding yes   Feeding Infant   Feeding Readiness Cues finger sucking   Satiety Cues calm after feeding   Effective Latch During Feeding yes   Audible Swallow yes   Suck/Swallow Coordination present   Lactation Interventions   Attachment Promotion breastfeeding assistance provided;face-to-face positioning promoted;infant-mother separation minimized;privacy provided;skin-to-skin contact encouraged

## 2017-01-16 NOTE — PLAN OF CARE
Problem: Patient Care Overview  Goal: Individualization & Mutuality  Outcome: Ongoing (interventions implemented as appropriate)  VSS, ambulating in hallway with encouragement, voiding without difficulty, caring for infant at her bedside, breastfeeding without difficulty.

## 2017-01-16 NOTE — PLAN OF CARE
Problem: Patient Care Overview  Goal: Plan of Care Review  Outcome: Ongoing (interventions implemented as appropriate)  Plan of care reviewed with patient and family members, all questions answered.

## 2017-01-16 NOTE — PROGRESS NOTES
Discharge teaching given to pt. With prescription education. Pt. verbalized understanding with good recall noted. Pt. enc. To call md office once discharged to schedule a f/u appt. and for any questions or concerns that may arise once discharged. No c/o pain at this time.   S.o at pt. Bedside.

## 2017-01-16 NOTE — LACTATION NOTE
Lactation discharge teaching done, handouts from lactation packets used to re-enforce teaching. Mother verbalized understanding of instructions

## 2017-01-16 NOTE — DISCHARGE INSTRUCTIONS
After a Vaginal Birth    General Discharge Instructions  · May follow a regular diet, unless otherwise discussed with physician.  · Take showers, not baths unless otherwise discussed with physician.  · Activity as tolerated.  · No lifting or heavy exercise for 6 weeks, no driving for 2 weeks, no sexual intercourse, douching or tampons for 6 weeks  · May return to work/school as discussed with physician  · Discuss birth control with physician  · Breast care support bra worn at all times  · Lactation consultant referral number ( [phone] or [phone])    Call Your Healthcare Provider Right Away If You Have:  · A fever of 101°F or higher.  · Heavy vaginal bleeding, clots, or vaginal discharge with foul odor.  · Redness, discharge, or pain worse than you had in the hospital.  · Burning, pain, red streaks, or lumpy areas in your breasts.  · Cracks, blisters, or blood on your nipples.  · Burning or pain when you urinate.  · Persistent nausea or vomiting.  · Severe headaches, blurred vision, dizziness or fainting.  · Feelings of extreme sadness or anxiety, or a feeling that you dont want to be with your baby.  · No bowel movement for 5 days.  · Redness, warmth, swelling, or pain in the lower leg.      If You Had Stitches  You may have received stitches in the skin near your vagina. The stitches might have closed an episiotomy (an incision that enlarges the opening of the vagina). Or you may have needed stitches to repair torn skin. Either way, your stitches should dissolve within weeks. Until then, you can help reduce discomfort, aid healing, and reduce your risk of infection by keeping the stitches clean. These tips can help:  · Gently wipe from front to back after you urinate or have a bowel movement.  · After wiping, spray warm water on the area. Or you can have a sitz bath. This means sitting in a tub with a few inches of water in it. Then pat the area dry or use a hairdryer on a cool setting.  · Do not use  soap or any solution except water on the area.  · You can take a shower unless told not to.  · Change sanitary pads at least every 2-4 hours.  · Place cold or heat packs on the area as directed by your doctors or nurses. Keep a thin towel between the pack and your skin.  · Sit on firm seats so the stitches pull less.       Follow-Up  Schedule a  follow-up exam with your healthcare provider for about 6 weeks after delivery. During this exam, your uterus and vaginal area will be checked. Contact your healthcare provider if you think you or your baby are having any problems.    After a Vaginal Birth   After having a baby, your body may be very tired. It can take time to recover from a vaginal delivery. You may stay in the hospital or birth center from 1 to 4 days. In some cases, you may be able to go home the same day.       Right after the delivery   Your temperature and blood pressure will be taken until they are stable. A nurse or other healthcare provider will observe you as you rest. You may have afterbirth pains. These are cramps caused by the uterus shrinking. Sanitary pads are used to absorb the discharge of the uterine lining. To ensure that you arent bleeding too much, the pad will be checked. And the firmness of your uterus will be checked. To do this, a nurse will gently push down on your abdomen. If you had anesthesia, youll be watched closely until you can feel and move your toes. If you have perineal pain (pain between the vagina and anus), an ice pack can help.    care   While still in the hospital or birth center, youll learn how to hold and feed your baby. You will also be given instructions on how to care for your baby. This includes bathing and feeding.   Preparing to go home   You may be anxious to go home as soon as possible. Before you and your baby go home, a healthcare provider will check to be sure you are healthy enough to take care of your baby and yourself.  Youre ready to go home when:   You can walk to the bathroom without help.   You can eat solid food and swallow pills (if needed).   You have no sign of infection or other health problems, including fever.   Before leaving the hospital or birth center, youll be given written instructions for home self-care after vaginal delivery. Be sure to follow these instructions carefully. If you have questions or concerns, talk about them now.   If you had stitches   You may have received stitches in the skin near your vagina. The stitches might have closed an episiotomy (an incision that enlarges the opening of the vagina). Or you may have needed stitches to repair torn skin. Either way, your stitches should dissolve within weeks. Until then, you can help reduce discomfort, aid healing, and reduce your risk of infection by keeping the stitches clean. These tips can help:   Gently wipe from front to back after you urinate or have a bowel movement.   After wiping, spray warm water on the area. Or you can have a sitz bath. This means sitting in a tub with a few inches of water in it. Then pat the area dry or use a hairdryer on a cool setting.   Do not use soap or any solution except water on the area.   You can take a shower unless told not to.   Change sanitary pads at least every 2-4 hours.   Place cold or heat packs on the area as directed by your doctors or nurses. Keep a thin towel between the pack and your skin.   Sit on firm seats so the stitches pull less.   follow-up   Schedule a  follow-up exam with your health care provider for about 6 weeks after delivery. During this exam, your uterus and vaginal area will be checked. Contact your health care provider if you think you or your baby are having any problems.       After Delivery: When to Call the Health Care Provider   Health problems sometimes arise with you or your baby following delivery. Call your baby's health care provider or your health care  provider if you see any of the signs below.       Watch your baby for these signs   Call your babys health care provider if your baby:   Has a rectal temperature of 100.4°F (38°C) or higher   Has fewer than 6 wet diapers a day (Hint: Disposable diapers may feel heavy or hard after being soaked.)   Skin or whites of the eyes appear yellow   Cries for a long time, or if it sounds as if the cries are caused by pain   Has diarrhea   Refuses two feedings in a row   Is inactive or listless   Is vomiting   Has blood in the stool or vomit   Has a rash   Has ear drainage   Has difficulty breathing   Has a seizure   Will not wake up  Trust your instincts. If you are concerned about your baby, call your health care provider.   Watch your own health for these signs   Call your own health care provider if you have:   Burning or pain in your breasts   Red streaks or hard lumpy areas in your breasts   Problems with breast feeding   A fever of 100.4°F (38°C) or higher   Extreme tiredness or body aches, as if you have the flu   Feelings of extreme sadness or anxiety, or a feeling that you dont want to be with your baby   Abdominal pain that isnt relieved with medicine   Vaginal discharge that has a bad odor   Vaginal bleeding that soaks more than one pad per hour   If you had a  section, call for concerns about your incision site such as pain, drainage or bleeding from your incision      Incision Care After Vaginal Birth   After your babys birth, you may have needed stitches in the skin near your vagina. The stitches might have closed an episiotomy (an incision that enlarges the opening of the vagina). Or you may have needed stitches to repair torn skin. Either way, your stitches should dissolve within weeks. Until then, use this handout as a guide to help ease any discomfort and aid healing.       Keep Clean   You can reduce your risk of infection by keeping the area around the stitches clean. These hints can help:    Gently wipe from front to back after you urinate or have a bowel movement.   After wiping, spray warm water on the stitches. Pat dry. If you are too sore, just spray the area after urination and then pat dry without wiping. If you are too sore, just spray the area after urination and then pat dry without wiping.   Do not use soap or any solution except water unless instructed by your health care provider.   Change sanitary pads at least every 2-4 hours.      Eat to Stay Regular   Having bowel movements is easier if youre not constipated. Follow these tips:   Eat fresh fruit and vegetables, whole grains, and bran cereals.   Drink plenty of water.   Dont strain to have a bowel movement.   Ask your health care provider about using a stool softener. If you are breastfeeding, ask before you take any medication.      Reduce Your Discomfort   Sit in a warm bath (sitz bath).   Place cold or heat packs on your stitches. Keep a thin towel between the pack and your skin.   Sit on a firm seat so the stitches pull less.   Use medicated spray as ordered by your health care provider.  Call your doctor or health care provider if you have:   Repeated clots the size of a quarter or larger passing from the vagina   Heavy or gushing bleeding from the vagina   Discharge that has a bad odor   Severe pain in the abdomen or increased pain near your stitches   Fever or chills   No bowel movement within one week after the birth of your baby   Pain or urgency with urination, or inability to urinate        Breast Care After Birth   A few days after your babys birth, your breasts will swell with milk. They are likely to feel tender and heavy. This is normal. To help prevent breast soreness and control irritation, follow these tips:       Coping with swelling   Use cold compresses or an ice pack to help reduce the ache or pain.   Breastfeed often to keep milk from clogging your breast ducts.   If your nipples are flat from breast swelling,  hand express some milk. Squeeze out a few drops of milk by massaging and compressing your breasts.   If you have swelling with pain or fever, call your health care provider.  Preventing sore nipples   Make sure baby latches on to your breast correctly. The babys tongue should always be under your nipple, and your entire areola should be in the baby's mouth.   You can let milk dry on your nipples. This dried milk can protect the skin on your nipple/breasts.   Do not use alcohol, soap, or scented cleansers on your breasts. These can cause the nipples to dry and crack.   Do not wear nursing pads that are lined with plastic. They hold in moisture and can cause chapping.   If you experience cracked or bleeding nipples, consult your doctor or a lactation consultant. He or she will ensure that your baby's latch is correct and may suggest topical treatment, such as pure lanolin.  Choosing a good bra   Wearing the right-sized bra is especially important now. If a bra is too tight, it may cause a duct in your breast to clog and become irritated. If possible, have a salesperson help fit you for a new bra. Look for one thats 100% cotton and comfortable. Also, choose a bra with wide straps that wont dig into your back and shoulders. If youre breastfeeding, find a nursing bra that allows you to uncover one breast at a time.   If you are not breastfeeding:   Avoid stimulation of nipples   Wear a tight-fitting bra   Apply ice packs for discomfort

## 2017-01-16 NOTE — PLAN OF CARE
Problem: Postpartum (Vaginal Delivery) (Adult,Obstetrics,Pediatric)  Intervention: Support Life/Role Transition  Infant remains at mother's bedside, declines assistance and states she will call if she needs help with breastfeeding.

## 2017-01-16 NOTE — DISCHARGE SUMMARY
Discharge Summary - Obstetrics    Diagnosis: s/p , asymptomatic anemia    Patient was admitted, underwent a normal spontaneous vaginal delivery without complication.  Please see H&P and operative report for full details.  Postpartum course was uneventful.  She denied s/s of anemia.    Vitals:  Temp: 97.3 °F (36.3 °C) (17 0754)  Pulse: 82 (17 0754)  Resp: 16 (17 0754)  BP: 129/73 (17 0754)  SpO2: 100 % (17 1703)  Temp:  [97.3 °F (36.3 °C)-98 °F (36.7 °C)]   Pulse:  [82-94]   Resp:  [16-20]   BP: (114-144)/(73-89)      General: no acute distress, alert and oriented to person, place and time  Breasts: nontender, nonengorged  Abdomen: non-distended, non-tender to palpation, uterus firm and below the umbilicus  Pelvic: deferred, reported minimal uterine bleeding  Extremities: calves non-tender to palpation, no calf edema, erythema or palpable cords    Recent Labs  Lab 01/15/17  0611   WBC 11.35   HGB 7.8*   HCT 25.3*        Disposition: Home    Condition: Stable    Diet: Regular    Medications:  Current Discharge Medication List      START taking these medications    Details   ibuprofen (ADVIL,MOTRIN) 600 MG tablet Take 1 tablet (600 mg total) by mouth every 6 (six) hours as needed for Pain.  Qty: 30 tablet, Refills: 0         CONTINUE these medications which have CHANGED    Details   ferrous sulfate 325 mg (65 mg iron) Tab tablet Take 1 tablet (325 mg total) by mouth 3 (three) times daily.  Qty: 90 tablet, Refills: 2         CONTINUE these medications which have NOT CHANGED    Details   butalbital-acetaminophen-caffeine -40 mg (FIORICET) -40 mg per tablet Take 1-2 tablets by mouth every 6 (six) hours as needed for Pain.  Qty: 30 tablet, Refills: 0    Associated Diagnoses: Prenatal care, subsequent pregnancy, first trimester      famotidine (PEPCID) 40 MG tablet Take 1 tablet (40 mg total) by mouth nightly as needed for Heartburn.  Qty: 30 tablet, Refills: 1       prenatal vit#112-folic acid 1 mg Chew Take 1 tablet by mouth once daily.  Qty: 30 tablet, Refills: 9    Associated Diagnoses: Prenatal care, subsequent pregnancy, second trimester      promethazine (PHENERGAN) 25 MG tablet TK ONE PO Q 6 H PRN FOR NAUSEA  Refills: 4             Activity: Pelvic rest    Follow-up: 6 weeks    Precautions: Patient counseled to return or call for temperature greater than 100.4, copious foul- smelling vaginal discharge, profuse vaginal bleeding, extreme pain, nausea or vomiting, or any concerns.      Discharge Procedure Orders  Diet general

## 2017-01-18 ENCOUNTER — TELEPHONE (OUTPATIENT)
Dept: OBSTETRICS AND GYNECOLOGY | Facility: HOSPITAL | Age: 22
End: 2017-01-18

## 2017-01-18 NOTE — TELEPHONE ENCOUNTER
"Spoke with Ms. Welch; States breastfeeding is going well; Infant breastfeeds about every 2-3 hours for about 15 min on each breast per feeding; States that her breasts are full and heavy at times but become softer after the baby eats; Baby is having about 8 yellow runny stools a day and "a lot" of wets- could not recall an exact number; Nipples are not painful; Has ped appointment on Friday; Encouraged mother to call lactation with needs or concerns; Verbalized understanding  "

## 2017-02-24 PROBLEM — O47.9 LABOR, FALSE (BRAXTON-HICKS): Status: RESOLVED | Noted: 2017-01-14 | Resolved: 2017-02-24

## 2019-07-22 ENCOUNTER — INFUSION (OUTPATIENT)
Dept: INFUSION THERAPY | Facility: HOSPITAL | Age: 24
End: 2019-07-22
Attending: OBSTETRICS & GYNECOLOGY
Payer: MEDICAID

## 2019-07-22 VITALS
HEART RATE: 83 BPM | OXYGEN SATURATION: 99 % | DIASTOLIC BLOOD PRESSURE: 58 MMHG | TEMPERATURE: 98 F | RESPIRATION RATE: 18 BRPM | SYSTOLIC BLOOD PRESSURE: 118 MMHG

## 2019-07-22 DIAGNOSIS — D56.3 ALPHA THALASSEMIA MINOR: Primary | ICD-10-CM

## 2019-07-22 DIAGNOSIS — O99.019 ANEMIA IN PREGNANCY: ICD-10-CM

## 2019-07-22 PROCEDURE — 96365 THER/PROPH/DIAG IV INF INIT: CPT

## 2019-07-22 PROCEDURE — 63600175 PHARM REV CODE 636 W HCPCS: Performed by: OBSTETRICS & GYNECOLOGY

## 2019-07-22 PROCEDURE — 25000003 PHARM REV CODE 250: Performed by: OBSTETRICS & GYNECOLOGY

## 2019-07-22 PROCEDURE — 96375 TX/PRO/DX INJ NEW DRUG ADDON: CPT

## 2019-07-22 RX ORDER — DIPHENHYDRAMINE HYDROCHLORIDE 50 MG/ML
25 INJECTION INTRAMUSCULAR; INTRAVENOUS
Status: COMPLETED | OUTPATIENT
Start: 2019-07-22 | End: 2019-07-22

## 2019-07-22 RX ADMIN — SODIUM CHLORIDE 125 MG: 9 INJECTION, SOLUTION INTRAVENOUS at 12:07

## 2019-07-22 RX ADMIN — DIPHENHYDRAMINE HYDROCHLORIDE 25 MG: 50 INJECTION, SOLUTION INTRAMUSCULAR; INTRAVENOUS at 01:07

## 2019-07-22 NOTE — PLAN OF CARE
Problem: Adult Inpatient Plan of Care  Goal: Plan of Care Review  Outcome: Ongoing (interventions implemented as appropriate)  Pt arrived to unit. Oriented to unit. VSS. Use and side effects of Ferrlecit explained to pt, verbalized understanding. Ferrlecit initiated at 1231. 1315, pt reports itching all over. No SOB or tickle in throat. Medicated with benadryl. 1355, itching resolved and Ferrlecit resumed. 1415, Ferrlecit completed. Will monitor. 1515. No reactions noted. No reports of itching. Pt given next appt. Pt ambulated off unit, accompanied by father. No distress noted.

## 2019-07-23 ENCOUNTER — HOSPITAL ENCOUNTER (OUTPATIENT)
Facility: HOSPITAL | Age: 24
Discharge: HOME | End: 2019-07-23
Attending: OBSTETRICS & GYNECOLOGY | Admitting: OBSTETRICS & GYNECOLOGY
Payer: MEDICAID

## 2019-07-23 VITALS
OXYGEN SATURATION: 98 % | DIASTOLIC BLOOD PRESSURE: 64 MMHG | HEART RATE: 85 BPM | TEMPERATURE: 98 F | SYSTOLIC BLOOD PRESSURE: 110 MMHG | RESPIRATION RATE: 18 BRPM

## 2019-07-23 DIAGNOSIS — O26.893 PELVIC PAIN AFFECTING PREGNANCY IN THIRD TRIMESTER, ANTEPARTUM: ICD-10-CM

## 2019-07-23 DIAGNOSIS — R10.2 PELVIC PAIN AFFECTING PREGNANCY IN THIRD TRIMESTER, ANTEPARTUM: ICD-10-CM

## 2019-07-23 LAB
BACTERIA #/AREA URNS HPF: ABNORMAL /HPF
BILIRUB UR QL STRIP: NEGATIVE
CLARITY UR: CLEAR
COLOR UR: YELLOW
GLUCOSE UR QL STRIP: NEGATIVE
HGB UR QL STRIP: NEGATIVE
KETONES UR QL STRIP: NEGATIVE
LEUKOCYTE ESTERASE UR QL STRIP: ABNORMAL
MICROSCOPIC COMMENT: ABNORMAL
NITRITE UR QL STRIP: NEGATIVE
PH UR STRIP: 7 [PH] (ref 5–8)
PROT UR QL STRIP: NEGATIVE
RBC #/AREA URNS HPF: 0 /HPF (ref 0–4)
SP GR UR STRIP: 1.01 (ref 1–1.03)
SQUAMOUS #/AREA URNS HPF: 4 /HPF
URN SPEC COLLECT METH UR: ABNORMAL
UROBILINOGEN UR STRIP-ACNC: NEGATIVE EU/DL
WBC #/AREA URNS HPF: 5 /HPF (ref 0–5)

## 2019-07-23 PROCEDURE — 81000 URINALYSIS NONAUTO W/SCOPE: CPT

## 2019-07-23 PROCEDURE — 99211 OFF/OP EST MAY X REQ PHY/QHP: CPT

## 2019-07-24 NOTE — TREATMENT PLAN
ua results called in to dr jiménez;order for dc and prescription for macrobid to be called in to pharmacy

## 2019-07-24 NOTE — DISCHARGE INSTRUCTIONS
Home Undelivered Discharge Instructions    After Discharge Orders:    Future Appointments   Date Time Provider Department Center   7/29/2019 11:30 AM CHAIR 02 [facility]   8/5/2019 10:00 AM Moreno Caicedo MD [facility]   8/5/2019 11:30 AM CHAIR 01 [facility]           Current Discharge Medication List      CONTINUE these medications which have NOT CHANGED    Details   ferrous gluconate (FERGON) 324 MG tablet Take 1 tablet (324 mg total) by mouth daily with breakfast.  Qty: 30 tablet, Refills: 11    Associated Diagnoses: Encounter for supervision of other normal pregnancy in second trimester      fluticasone propionate (FLONASE) 50 mcg/actuation nasal spray 2 sprays (100 mcg total) by Each Nare route once daily.  Qty: 16 g, Refills: 1      prenatal vit,barbara 74-iron-folic 27 mg iron- 1 mg Tab Take 1 tablet by mouth once daily.  Qty: 30 tablet, Refills: 11    Associated Diagnoses: Encounter for supervision of other normal pregnancy in second trimester      ranitidine (ZANTAC) 300 MG tablet Take 1 tablet (300 mg total) by mouth nightly.  Qty: 30 tablet, Refills: 1                     · Diet:  normal diet as tolerated    · Rest: normal activity as tolerated    Other instructions: Do kick counts once a day on your baby. Choose the time of day your baby is most active. Get in a comfortable lying or sitting position and time how long it takes to feel 10 kicks, twists, turns, swishes, or rolls. Call your physician or midwife if there have not been 10 kicks in 1 hours    Call physician or midwife, return to Labor and Delivery, call 911, or go to the nearest Emergency Room if: increased leakage or fluid, decreased fetal movement, persistent low back pain or cramping, bleeding from vaginal area, persistent headache or vision change

## 2019-07-24 NOTE — TREATMENT PLAN
Pt arrived with c/o vag pressure and pain that began this morning;pt states that she wanted to come in to be sure that she is not in premature labor;denies vag bleeding and leaking of fluid;abd soft and nontender to palp;+fm noted;pt states she had a light headache and drank some coke today;states she didn't drink any water;poc discussed;mother and child at bedside

## 2019-07-29 ENCOUNTER — INFUSION (OUTPATIENT)
Dept: INFUSION THERAPY | Facility: HOSPITAL | Age: 24
End: 2019-07-29
Attending: OBSTETRICS & GYNECOLOGY
Payer: MEDICAID

## 2019-07-29 VITALS
RESPIRATION RATE: 18 BRPM | TEMPERATURE: 97 F | DIASTOLIC BLOOD PRESSURE: 66 MMHG | OXYGEN SATURATION: 98 % | SYSTOLIC BLOOD PRESSURE: 117 MMHG | HEART RATE: 92 BPM

## 2019-07-29 DIAGNOSIS — D56.3 ALPHA THALASSEMIA MINOR: Primary | ICD-10-CM

## 2019-07-29 PROCEDURE — 63600175 PHARM REV CODE 636 W HCPCS: Performed by: OBSTETRICS & GYNECOLOGY

## 2019-07-29 PROCEDURE — 96365 THER/PROPH/DIAG IV INF INIT: CPT

## 2019-07-29 RX ADMIN — SODIUM CHLORIDE 125 MG: 9 INJECTION, SOLUTION INTRAVENOUS at 11:07

## 2019-08-06 PROBLEM — O26.893 PELVIC PAIN AFFECTING PREGNANCY IN THIRD TRIMESTER, ANTEPARTUM: Status: RESOLVED | Noted: 2019-07-23 | Resolved: 2019-08-06

## 2019-08-06 PROBLEM — R10.2 PELVIC PAIN AFFECTING PREGNANCY IN THIRD TRIMESTER, ANTEPARTUM: Status: RESOLVED | Noted: 2019-07-23 | Resolved: 2019-08-06

## 2019-08-07 ENCOUNTER — INFUSION (OUTPATIENT)
Dept: INFUSION THERAPY | Facility: HOSPITAL | Age: 24
End: 2019-08-07
Attending: OBSTETRICS & GYNECOLOGY
Payer: MEDICAID

## 2019-08-07 VITALS
RESPIRATION RATE: 18 BRPM | TEMPERATURE: 98 F | HEART RATE: 92 BPM | DIASTOLIC BLOOD PRESSURE: 55 MMHG | OXYGEN SATURATION: 98 % | SYSTOLIC BLOOD PRESSURE: 114 MMHG

## 2019-08-07 DIAGNOSIS — D56.3 ALPHA THALASSEMIA MINOR: Primary | ICD-10-CM

## 2019-08-07 PROCEDURE — 96365 THER/PROPH/DIAG IV INF INIT: CPT

## 2019-08-07 PROCEDURE — 63600175 PHARM REV CODE 636 W HCPCS: Performed by: OBSTETRICS & GYNECOLOGY

## 2019-08-07 RX ADMIN — SODIUM CHLORIDE 125 MG: 9 INJECTION, SOLUTION INTRAVENOUS at 12:08

## 2019-08-07 NOTE — PLAN OF CARE
Problem: Adult Inpatient Plan of Care  Goal: Plan of Care Review  Outcome: Ongoing (interventions implemented as appropriate)  Pt presented for third Ferrlecit infusion. VSS. Pt reported fatigue and that she stopped craving as much ice. Tolerated Ferrlecit well. Ambulatory into and out of unit. Distress screening tool completed. Future appt information reviewed with pt.

## 2019-08-29 ENCOUNTER — INFUSION (OUTPATIENT)
Dept: INFUSION THERAPY | Facility: HOSPITAL | Age: 24
End: 2019-08-29
Attending: OBSTETRICS & GYNECOLOGY
Payer: MEDICAID

## 2019-08-29 VITALS
SYSTOLIC BLOOD PRESSURE: 118 MMHG | TEMPERATURE: 98 F | HEART RATE: 90 BPM | RESPIRATION RATE: 18 BRPM | DIASTOLIC BLOOD PRESSURE: 63 MMHG | OXYGEN SATURATION: 98 %

## 2019-08-29 DIAGNOSIS — D56.3 ALPHA THALASSEMIA MINOR: Primary | ICD-10-CM

## 2019-08-29 PROCEDURE — 96365 THER/PROPH/DIAG IV INF INIT: CPT

## 2019-08-29 PROCEDURE — 63600175 PHARM REV CODE 636 W HCPCS: Performed by: OBSTETRICS & GYNECOLOGY

## 2019-08-29 RX ADMIN — SODIUM CHLORIDE 125 MG: 9 INJECTION, SOLUTION INTRAVENOUS at 12:08

## 2019-08-29 NOTE — PLAN OF CARE
Problem: Adult Inpatient Plan of Care  Goal: Plan of Care Review  Outcome: Ongoing (interventions implemented as appropriate)  Pt presented for Ferrlecit infusion 1/3. VSS. Pt reported fatigue and being overall uncomfortable as she nears the end of her pregnancy. Tolerated Ferrlecit without issue. Monitored for 30 minutes post with no reactions noted. Lunch provided. Ambulated into and out of unit independently. Distress screening tool completed. Future appt information reviewed with pt.

## 2019-09-05 ENCOUNTER — INFUSION (OUTPATIENT)
Dept: INFUSION THERAPY | Facility: HOSPITAL | Age: 24
End: 2019-09-05
Attending: OBSTETRICS & GYNECOLOGY
Payer: MEDICAID

## 2019-09-05 VITALS
TEMPERATURE: 98 F | DIASTOLIC BLOOD PRESSURE: 65 MMHG | RESPIRATION RATE: 18 BRPM | HEART RATE: 97 BPM | OXYGEN SATURATION: 98 % | SYSTOLIC BLOOD PRESSURE: 120 MMHG

## 2019-09-05 DIAGNOSIS — D56.3 ALPHA THALASSEMIA MINOR: Primary | ICD-10-CM

## 2019-09-05 PROCEDURE — 96365 THER/PROPH/DIAG IV INF INIT: CPT

## 2019-09-05 PROCEDURE — 63600175 PHARM REV CODE 636 W HCPCS: Performed by: OBSTETRICS & GYNECOLOGY

## 2019-09-05 RX ADMIN — SODIUM CHLORIDE 125 MG: 9 INJECTION, SOLUTION INTRAVENOUS at 12:09

## 2019-09-05 NOTE — PLAN OF CARE
Problem: Adult Inpatient Plan of Care  Goal: Plan of Care Review  Outcome: Ongoing (interventions implemented as appropriate)  Arrived to unit. No complaints voiced. Still has fatigue despite Fe treatments. Tolerated Ferrlecit. No reactions noted. VSS. Pt has next appt. Pt ambulated off unit, no distress noted.

## 2019-09-12 ENCOUNTER — INFUSION (OUTPATIENT)
Dept: INFUSION THERAPY | Facility: HOSPITAL | Age: 24
End: 2019-09-12
Attending: OBSTETRICS & GYNECOLOGY
Payer: MEDICAID

## 2019-09-12 VITALS
TEMPERATURE: 98 F | RESPIRATION RATE: 18 BRPM | HEART RATE: 96 BPM | DIASTOLIC BLOOD PRESSURE: 73 MMHG | OXYGEN SATURATION: 99 % | SYSTOLIC BLOOD PRESSURE: 122 MMHG

## 2019-09-12 DIAGNOSIS — D56.3 ALPHA THALASSEMIA MINOR: Primary | ICD-10-CM

## 2019-09-12 PROCEDURE — 96365 THER/PROPH/DIAG IV INF INIT: CPT

## 2019-09-12 PROCEDURE — 63600175 PHARM REV CODE 636 W HCPCS: Performed by: OBSTETRICS & GYNECOLOGY

## 2019-09-12 RX ADMIN — SODIUM CHLORIDE 125 MG: 9 INJECTION, SOLUTION INTRAVENOUS at 12:09

## 2019-09-12 NOTE — PLAN OF CARE
Problem: Adult Inpatient Plan of Care  Goal: Plan of Care Review  Outcome: Ongoing (interventions implemented as appropriate)  Patient completed dose 3 Ferrlecit IVPB. Tolerated well. VSS. Patient monitored 15 minutes after infusion. Received discharge instructions and follow up appointments. Verbalized understanding and ambulated unassisted off unit accompanied by significant other. Patient in NAD at time of discharge.

## 2019-09-27 ENCOUNTER — HOSPITAL ENCOUNTER (INPATIENT)
Facility: HOSPITAL | Age: 24
LOS: 2 days | Discharge: HOME | End: 2019-09-29
Attending: OBSTETRICS & GYNECOLOGY | Admitting: OBSTETRICS & GYNECOLOGY
Payer: MEDICAID

## 2019-09-27 ENCOUNTER — ANESTHESIA (OUTPATIENT)
Dept: OBSTETRICS AND GYNECOLOGY | Facility: HOSPITAL | Age: 24
End: 2019-09-27
Payer: MEDICAID

## 2019-09-27 ENCOUNTER — ANESTHESIA EVENT (OUTPATIENT)
Dept: OBSTETRICS AND GYNECOLOGY | Facility: HOSPITAL | Age: 24
End: 2019-09-27
Payer: MEDICAID

## 2019-09-27 DIAGNOSIS — Z34.90 PREGNANCY WITH ONE FETUS: Primary | ICD-10-CM

## 2019-09-27 LAB
ABO + RH BLD: NORMAL
ANISOCYTOSIS BLD QL SMEAR: ABNORMAL
BASOPHILS # BLD AUTO: 0.02 K/UL (ref 0–0.2)
BASOPHILS NFR BLD: 0.2 % (ref 0–1.9)
BLD GP AB SCN CELLS X3 SERPL QL: NORMAL
DIFFERENTIAL METHOD: ABNORMAL
EOSINOPHIL # BLD AUTO: 0.1 K/UL (ref 0–0.5)
EOSINOPHIL NFR BLD: 1 % (ref 0–8)
ERYTHROCYTE [DISTWIDTH] IN BLOOD BY AUTOMATED COUNT: 18.5 % (ref 11.5–14.5)
HCT VFR BLD AUTO: 29.8 % (ref 37–48.5)
HGB BLD-MCNC: 9.2 G/DL (ref 12–16)
HYPOCHROMIA BLD QL SMEAR: ABNORMAL
LYMPHOCYTES # BLD AUTO: 1.6 K/UL (ref 1–4.8)
LYMPHOCYTES NFR BLD: 16.5 % (ref 18–48)
MCH RBC QN AUTO: 20.9 PG (ref 27–31)
MCHC RBC AUTO-ENTMCNC: 30.9 G/DL (ref 32–36)
MCV RBC AUTO: 68 FL (ref 82–98)
MONOCYTES # BLD AUTO: 0.4 K/UL (ref 0.3–1)
MONOCYTES NFR BLD: 3.8 % (ref 4–15)
NEUTROPHILS # BLD AUTO: 7.5 K/UL (ref 1.8–7.7)
NEUTROPHILS NFR BLD: 79.6 % (ref 38–73)
PLATELET # BLD AUTO: 191 K/UL (ref 150–350)
PLATELET BLD QL SMEAR: ABNORMAL
PMV BLD AUTO: ABNORMAL FL (ref 9.2–12.9)
POIKILOCYTOSIS BLD QL SMEAR: SLIGHT
RBC # BLD AUTO: 4.41 M/UL (ref 4–5.4)
WBC # BLD AUTO: 9.62 K/UL (ref 3.9–12.7)

## 2019-09-27 PROCEDURE — 85025 COMPLETE CBC W/AUTO DIFF WBC: CPT

## 2019-09-27 PROCEDURE — 51702 INSERT TEMP BLADDER CATH: CPT

## 2019-09-27 PROCEDURE — 86901 BLOOD TYPING SEROLOGIC RH(D): CPT

## 2019-09-27 PROCEDURE — 72100002 HC LABOR CARE, 1ST 8 HOURS

## 2019-09-27 PROCEDURE — 59409 OBSTETRICAL CARE: CPT | Mod: AA,,, | Performed by: ANESTHESIOLOGY

## 2019-09-27 PROCEDURE — S0020 INJECTION, BUPIVICAINE HYDRO: HCPCS | Performed by: ANESTHESIOLOGY

## 2019-09-27 PROCEDURE — 25000003 PHARM REV CODE 250: Performed by: ANESTHESIOLOGY

## 2019-09-27 PROCEDURE — 25000003 PHARM REV CODE 250

## 2019-09-27 PROCEDURE — 25000003 PHARM REV CODE 250: Performed by: OBSTETRICS & GYNECOLOGY

## 2019-09-27 PROCEDURE — 72200005 HC VAGINAL DELIVERY LEVEL II

## 2019-09-27 PROCEDURE — 11000001 HC ACUTE MED/SURG PRIVATE ROOM

## 2019-09-27 PROCEDURE — 86592 SYPHILIS TEST NON-TREP QUAL: CPT

## 2019-09-27 PROCEDURE — 27200710 HC EPIDURAL INFUSION PUMP SET: Performed by: ANESTHESIOLOGY

## 2019-09-27 PROCEDURE — 62326 NJX INTERLAMINAR LMBR/SAC: CPT | Performed by: ANESTHESIOLOGY

## 2019-09-27 PROCEDURE — C1751 CATH, INF, PER/CENT/MIDLINE: HCPCS | Performed by: ANESTHESIOLOGY

## 2019-09-27 PROCEDURE — 59409 PRA ETRICAL CARE,VAG DELIV ONLY: ICD-10-PCS | Mod: AA,,, | Performed by: ANESTHESIOLOGY

## 2019-09-27 PROCEDURE — 63600175 PHARM REV CODE 636 W HCPCS: Performed by: OBSTETRICS & GYNECOLOGY

## 2019-09-27 RX ORDER — SODIUM CHLORIDE, SODIUM LACTATE, POTASSIUM CHLORIDE, CALCIUM CHLORIDE 600; 310; 30; 20 MG/100ML; MG/100ML; MG/100ML; MG/100ML
INJECTION, SOLUTION INTRAVENOUS CONTINUOUS
Status: DISCONTINUED | OUTPATIENT
Start: 2019-09-27 | End: 2019-09-27

## 2019-09-27 RX ORDER — DOCUSATE SODIUM 100 MG/1
200 CAPSULE, LIQUID FILLED ORAL 2 TIMES DAILY PRN
Status: DISCONTINUED | OUTPATIENT
Start: 2019-09-27 | End: 2019-09-28

## 2019-09-27 RX ORDER — FLUTICASONE PROPIONATE 50 MCG
2 SPRAY, SUSPENSION (ML) NASAL DAILY
Status: DISCONTINUED | OUTPATIENT
Start: 2019-09-28 | End: 2019-09-29 | Stop reason: HOSPADM

## 2019-09-27 RX ORDER — OXYTOCIN/RINGER'S LACTATE 30/500 ML
95 PLASTIC BAG, INJECTION (ML) INTRAVENOUS ONCE
Status: DISCONTINUED | OUTPATIENT
Start: 2019-09-27 | End: 2019-09-27

## 2019-09-27 RX ORDER — OXYCODONE AND ACETAMINOPHEN 5; 325 MG/1; MG/1
1 TABLET ORAL EVERY 4 HOURS PRN
Status: DISCONTINUED | OUTPATIENT
Start: 2019-09-27 | End: 2019-09-29 | Stop reason: HOSPADM

## 2019-09-27 RX ORDER — LABETALOL 100 MG/1
100 TABLET, FILM COATED ORAL EVERY 12 HOURS
Status: DISCONTINUED | OUTPATIENT
Start: 2019-09-27 | End: 2019-09-27

## 2019-09-27 RX ORDER — SODIUM CHLORIDE 9 MG/ML
INJECTION, SOLUTION INTRAVENOUS
Status: DISCONTINUED | OUTPATIENT
Start: 2019-09-27 | End: 2019-09-27

## 2019-09-27 RX ORDER — MISOPROSTOL 200 UG/1
TABLET ORAL
Status: COMPLETED
Start: 2019-09-27 | End: 2019-09-27

## 2019-09-27 RX ORDER — FERROUS GLUCONATE 324(38)MG
324 TABLET ORAL
Status: DISCONTINUED | OUTPATIENT
Start: 2019-09-28 | End: 2019-09-29 | Stop reason: HOSPADM

## 2019-09-27 RX ORDER — ACETAMINOPHEN 325 MG/1
650 TABLET ORAL EVERY 6 HOURS PRN
Status: DISCONTINUED | OUTPATIENT
Start: 2019-09-27 | End: 2019-09-29 | Stop reason: HOSPADM

## 2019-09-27 RX ORDER — MISOPROSTOL 200 UG/1
800 TABLET ORAL ONCE
Status: DISCONTINUED | OUTPATIENT
Start: 2019-09-27 | End: 2019-09-27

## 2019-09-27 RX ORDER — FENTANYL/BUPIVACAINE/NS/PF 2MCG/ML-.1
PLASTIC BAG, INJECTION (ML) INJECTION CONTINUOUS PRN
Status: DISCONTINUED | OUTPATIENT
Start: 2019-09-27 | End: 2019-09-27

## 2019-09-27 RX ORDER — OXYTOCIN/RINGER'S LACTATE 30/500 ML
95 PLASTIC BAG, INJECTION (ML) INTRAVENOUS ONCE
Status: DISCONTINUED | OUTPATIENT
Start: 2019-09-27 | End: 2019-09-29 | Stop reason: HOSPADM

## 2019-09-27 RX ORDER — FLUTICASONE PROPIONATE 50 MCG
2 SPRAY, SUSPENSION (ML) NASAL DAILY
Status: DISCONTINUED | OUTPATIENT
Start: 2019-09-27 | End: 2019-09-27

## 2019-09-27 RX ORDER — OXYCODONE AND ACETAMINOPHEN 10; 325 MG/1; MG/1
1 TABLET ORAL EVERY 4 HOURS PRN
Status: DISCONTINUED | OUTPATIENT
Start: 2019-09-27 | End: 2019-09-29 | Stop reason: HOSPADM

## 2019-09-27 RX ORDER — ONDANSETRON 8 MG/1
8 TABLET, ORALLY DISINTEGRATING ORAL EVERY 8 HOURS PRN
Status: DISCONTINUED | OUTPATIENT
Start: 2019-09-27 | End: 2019-09-27

## 2019-09-27 RX ORDER — OXYTOCIN/RINGER'S LACTATE 30/500 ML
2 PLASTIC BAG, INJECTION (ML) INTRAVENOUS CONTINUOUS
Status: DISCONTINUED | OUTPATIENT
Start: 2019-09-27 | End: 2019-09-27

## 2019-09-27 RX ORDER — ONDANSETRON 8 MG/1
8 TABLET, ORALLY DISINTEGRATING ORAL EVERY 8 HOURS PRN
Status: DISCONTINUED | OUTPATIENT
Start: 2019-09-27 | End: 2019-09-29 | Stop reason: HOSPADM

## 2019-09-27 RX ORDER — DIPHENHYDRAMINE HCL 25 MG
25 CAPSULE ORAL EVERY 4 HOURS PRN
Status: DISCONTINUED | OUTPATIENT
Start: 2019-09-27 | End: 2019-09-29 | Stop reason: HOSPADM

## 2019-09-27 RX ORDER — DIPHENHYDRAMINE HYDROCHLORIDE 50 MG/ML
25 INJECTION INTRAMUSCULAR; INTRAVENOUS EVERY 4 HOURS PRN
Status: DISCONTINUED | OUTPATIENT
Start: 2019-09-27 | End: 2019-09-29 | Stop reason: HOSPADM

## 2019-09-27 RX ORDER — OXYTOCIN/RINGER'S LACTATE 30/500 ML
334 PLASTIC BAG, INJECTION (ML) INTRAVENOUS ONCE
Status: DISCONTINUED | OUTPATIENT
Start: 2019-09-27 | End: 2019-09-27

## 2019-09-27 RX ORDER — PRENATAL WITH FERROUS FUM AND FOLIC ACID 3080; 920; 120; 400; 22; 1.84; 3; 20; 10; 1; 12; 200; 27; 25; 2 [IU]/1; [IU]/1; MG/1; [IU]/1; MG/1; MG/1; MG/1; MG/1; MG/1; MG/1; UG/1; MG/1; MG/1; MG/1; MG/1
1 TABLET ORAL DAILY
Status: DISCONTINUED | OUTPATIENT
Start: 2019-09-27 | End: 2019-09-29 | Stop reason: HOSPADM

## 2019-09-27 RX ORDER — IBUPROFEN 600 MG/1
600 TABLET ORAL EVERY 6 HOURS PRN
Status: DISCONTINUED | OUTPATIENT
Start: 2019-09-27 | End: 2019-09-29 | Stop reason: HOSPADM

## 2019-09-27 RX ORDER — BUPIVACAINE HYDROCHLORIDE 2.5 MG/ML
INJECTION, SOLUTION INFILTRATION; PERINEURAL
Status: COMPLETED | OUTPATIENT
Start: 2019-09-27 | End: 2019-09-27

## 2019-09-27 RX ADMIN — Medication 10 ML/HR: at 08:09

## 2019-09-27 RX ADMIN — OXYCODONE HYDROCHLORIDE AND ACETAMINOPHEN 1 TABLET: 10; 325 TABLET ORAL at 04:09

## 2019-09-27 RX ADMIN — VITAMIN A, VITAMIN C, VITAMIN D-3, VITAMIN E, VITAMIN B-1, VITAMIN B-2, NIACIN, VITAMIN B-6, CALCIUM, IRON, ZINC, COPPER 1 TABLET: 4000; 120; 400; 22; 1.84; 3; 20; 10; 1; 12; 200; 27; 25; 2 TABLET ORAL at 03:09

## 2019-09-27 RX ADMIN — OXYCODONE HYDROCHLORIDE AND ACETAMINOPHEN 1 TABLET: 5; 325 TABLET ORAL at 08:09

## 2019-09-27 RX ADMIN — MISOPROSTOL 800 MCG: 200 TABLET ORAL at 01:09

## 2019-09-27 RX ADMIN — OXYCODONE HYDROCHLORIDE AND ACETAMINOPHEN 1 TABLET: 10; 325 TABLET ORAL at 11:09

## 2019-09-27 RX ADMIN — Medication 2 MILLI-UNITS/MIN: at 06:09

## 2019-09-27 RX ADMIN — SODIUM CHLORIDE, SODIUM LACTATE, POTASSIUM CHLORIDE, AND CALCIUM CHLORIDE 1000 ML: .6; .31; .03; .02 INJECTION, SOLUTION INTRAVENOUS at 06:09

## 2019-09-27 RX ADMIN — SODIUM CHLORIDE, SODIUM LACTATE, POTASSIUM CHLORIDE, AND CALCIUM CHLORIDE: .6; .31; .03; .02 INJECTION, SOLUTION INTRAVENOUS at 07:09

## 2019-09-27 RX ADMIN — IBUPROFEN 600 MG: 600 TABLET, FILM COATED ORAL at 03:09

## 2019-09-27 RX ADMIN — BUPIVACAINE HYDROCHLORIDE 8 ML: 2.5 INJECTION, SOLUTION INFILTRATION; PERINEURAL at 08:09

## 2019-09-27 NOTE — LACTATION NOTE
"   09/27/19 1350   Pain/Comfort/Sleep   Pain Body Location - Side Bilateral   Pain Body Location breast   Pain Rating (0-10): Rest 0   Breasts WDL   Breast WDL WDL   Maternal Feeding Assessment   Maternal Emotional State relaxed;independent   Infant Positioning cradle   Signs of Milk Transfer audible swallow;infant jaw motion present   Latch Assistance no     Independently latched to right breast in cradle hold; audible swallows noted.   Basic breastfeeding instructions given and Mother's Breastfeeding Guide reviewed.  Encouraged to call for assist prn.  States "understand" and verbalized appropriate recall.    "

## 2019-09-27 NOTE — NURSING
0650 - Report received of  at 40 weeks 4 days admitted for IOL from MADDY Ruffin. Care assumed. Full assessment done, history and medications reviewed with pt. Pt and family updated on plan of care with questions answered. Bed in low locked position, call bell in reach.    0720 - Anesthesia notified pt would like epidural.    0735 - Dr. Cosby at bedside for SVE will AROM after pt gets epidural.    0820 - Anesthesia at bedside for epidural placement and consent, 0824 - time out performed, 0834 - Test dose negative.    1220 - Dr. Cosby updated on pt status /-3,pos , EFM reviewed.    1312 - Dr. Cosby notified pt complete/+3, Dr Wisdom on unit, will come for delivery.    1319 -  Delivery of viable female infant, APGAR 9/9.    1330 - Recovery started.    1645 - Report given to MADDY Morejon.    1655 - Pt transferred to room 243 via renetta steady, infant in crib at side. Pt and family oriented to new room, bed in low locked position, call bell in reach.

## 2019-09-27 NOTE — ANESTHESIA PROCEDURE NOTES
Epidural    Patient location during procedure: OB   Reason for block: primary anesthetic   Diagnosis: IUP   Start time: 9/27/2019 8:25 AM  Timeout: 9/27/2019 8:24 AM  End time: 9/27/2019 8:39 AM    Staffing  Performing Provider: Jc Quarles MD  Authorizing Provider: Jc Quarles MD        Preanesthetic Checklist  Completed: patient identified, site marked, pre-op evaluation, timeout performed, IV checked, monitors and equipment checked, anesthesia consent given, hand hygiene performed and patient being monitored  Preparation  Patient position: sitting  Prep: ChloraPrep  Patient monitoring: Pulse Ox and Blood Pressure  Epidural  Skin Anesthetic: lidocaine 1%  Skin Wheal: 3 mL  Administration type: single shot  Approach: midline  Interspace: L3-4    Injection technique: ANTHONY air  Needle and Epidural Catheter  Needle type: Tuohy   Needle gauge: 17  Needle length: 3.5 inches  Needle insertion depth: 7 cm  Catheter type: SweetPerk  Catheter size: 19 G  Catheter at skin depth: 12 cm  Test dose: 3 mL of lidocaine 1.5% with Epi 1-to-200,000  Additional Documentation: incremental injection, negative aspiration for heme and CSF, no paresthesia on injection, no significant complaints from patient, no significant pain on injection and no signs/symptoms of IV or SA injection  Needle localization: anatomical landmarks  Medications:  Volume per aspiration: 4 mL  Time between aspirations: 3 minutes  Assessment  Upper dermatomal levels - Left: T10  Right: T10   Dermatomal levels determined by pinch or prick  Ease of block: easy  Patient's tolerance of the procedure: comfortable throughout block and no complaintsNo inadvertent dural puncture with Tuohy.  Dural puncture not performed with spinal needle

## 2019-09-27 NOTE — H&P
History and Physical - Obstetrics    Subjective:     Ms Welch is a 23 yo  @ 40w4d gestational age with an Estimated Date of Delivery: 19, who presents for an elective induction of labor.   Her current obstetrical history is significant for:  · Anemia - chronic, iron deficiency, asymptomatic - s/p iron transfusions  · Suspect LGA (8#15oz)  · Alpha thalassemia  · Childhood asthma     She reports starting to feel contractions on pitocin. Fetal Movement: normal.    Review of Systems  Nine system ROS negative except for HPI    PTA Medications   Medication Sig    ferrous gluconate (FERGON) 324 MG tablet Take 1 tablet (324 mg total) by mouth daily with breakfast.    fluticasone propionate (FLONASE) 50 mcg/actuation nasal spray 2 sprays (100 mcg total) by Each Nare route once daily.    prenatal vit,barbara 74-iron-folic 27 mg iron- 1 mg Tab Take 1 tablet by mouth once daily.    promethazine (PHENERGAN) 12.5 MG Tab Take 1 tablet (12.5 mg total) by mouth every 6 (six) hours as needed.    ranitidine (ZANTAC) 300 MG tablet Take 1 tablet (300 mg total) by mouth nightly.       Review of patient's allergies indicates:  No Known Allergies     Past Medical History:   Diagnosis Date    alpha thalassemia     Asthma     Asthma - childhood 2019    Headache, migraine      History reviewed. No pertinent surgical history.  OB History        2    Para   1    Term   1            AB        Living   1       SAB        TAB        Ectopic        Multiple   0    Live Births   1               SHx: previous THC, quit    FHx: denies    Objective:   Vital Signs (Most Recent)  Temp: (P) 98.2 °F (36.8 °C) (19 0520)  Pulse: 86 (19 0654)  Resp: 16 (1951)  BP: 137/81 (19 0651)  SpO2: 99 % (1954)  Fetal Heart Tones: reviewed    General: no acute distress, alert and oriented to person, place and time  HEENT: head atraumatic, normocephalic, moist mucous membranes, neck supple, no large  thyroid masses, no cervical or clavicular lymphadenopathy  Abdomen: gravid, no palpable contractions  Incision(s): none  Extremities: calves non-tender to palpation, no calf edema, erythema or palpable cords  Cervix: 2/50/-2, vertex    Laboratory: see results console    Assessment:     25 yo  @ 40w4d gestational age for elective induction of labor with pitocin.    Plan:   Actively manage - get epidural, then amniotomy  Anemia - chronic, iron deficiency, asymptomatic - s/p iron transfusions - noted  Suspect LGA (8#15oz) - noted - low threshold for  - patient declines primary  now  Alpha thalassemia - noted  Childhood asthma - noted

## 2019-09-27 NOTE — PLAN OF CARE
VSS. NADN. Respirations unlabored. Patient is tolerating a regular diet. Patient's first void post delivery was @ 16:40 per labor & deliver. Patient arrived on the unit saline locked. POC discussed with the patient, and she verbalized understanding.

## 2019-09-27 NOTE — ANESTHESIA PREPROCEDURE EVALUATION
09/27/2019  Jessica Welch is a 24 y.o., female.    Anesthesia Evaluation    I have reviewed the Patient Summary Reports.    I have reviewed the Nursing Notes.      Review of Systems  Anesthesia Hx:  No problems with previous Anesthesia  Denies Family Hx of Anesthesia complications.    Social:  Non-Smoker, No Alcohol Use    Hematology/Oncology:         -- Anemia: Hematology Comments: Alpha-thalassemia; on iron; no bleeding disorder   Cardiovascular:   Exercise tolerance: good Denies Hypertension.  Denies MI.   Denies Dysrhythmias.   Denies Angina.    Pulmonary:   Asthma Childhood; no current symptoms   Renal/:  Renal/ Normal     Hepatic/GI:  Hepatic/GI Normal    OB/GYN/PEDS:  IUP   Neurological:   Headaches    Endocrine:  Endocrine Normal        Physical Exam  General:  Well nourished    Airway/Jaw/Neck:  Airway Findings: Mouth Opening: Normal Tongue: Normal  Mallampati: III  TM Distance: 4 - 6 cm  Jaw/Neck Findings:  Neck ROM: Normal ROM  Neck Findings:      Dental:  Dental Findings: In tact   Chest/Lungs:  Chest/Lungs Findings: Clear to auscultation, Normal Respiratory Rate     Heart/Vascular:  Heart Findings: Rate: Normal  Rhythm: Regular Rhythm        Mental Status:  Mental Status Findings:  Cooperative, Alert and Oriented       Wt Readings from Last 3 Encounters:   09/27/19 107 kg (236 lb)   09/24/19 107 kg (236 lb)   09/17/19 105.7 kg (233 lb)     Temp Readings from Last 3 Encounters:   09/27/19 36.8 °C (98.2 °F) (Oral)   09/12/19 36.8 °C (98.2 °F)   09/05/19 36.7 °C (98 °F)     BP Readings from Last 3 Encounters:   09/27/19 124/67   09/24/19 133/74   09/22/19 114/63     Pulse Readings from Last 3 Encounters:   09/27/19 85   09/22/19 91   09/12/19 96     Lab Results   Component Value Date    WBC 9.62 09/27/2019    HGB 9.2 (L) 09/27/2019    HCT 29.8 (L) 09/27/2019    MCV 68 (L) 09/27/2019    PLT  191 09/27/2019         Anesthesia Plan  Type of Anesthesia, risks & benefits discussed:  Anesthesia Type:  epidural, CSE  Patient's Preference:   Intra-op Monitoring Plan:   Intra-op Monitoring Plan Comments:   Post Op Pain Control Plan: epidural analgesia  Post Op Pain Control Plan Comments:   Induction:    Beta Blocker:  Patient is not currently on a Beta-Blocker (No further documentation required).       Informed Consent: Patient understands risks and agrees with Anesthesia plan.  Questions answered. Anesthesia consent signed with patient.  ASA Score: 2     Day of Surgery Review of History & Physical: I have interviewed and examined the patient. I have reviewed the patient's H&P dated:            Ready For Surgery From Anesthesia Perspective.

## 2019-09-27 NOTE — ANESTHESIA POSTPROCEDURE EVALUATION
Anesthesia Post Evaluation    Patient: Jessica Welch    Procedure(s) Performed: * No procedures listed *    Final Anesthesia Type: epidural  Patient location during evaluation: labor & delivery  Patient participation: Yes- Able to Participate  Level of consciousness: awake and alert  Post-procedure vital signs: reviewed and stable  Pain management: adequate  Airway patency: patent  PONV status at discharge: No PONV  Anesthetic complications: no      Cardiovascular status: hemodynamically stable  Respiratory status: unassisted and spontaneous ventilation  Hydration status: euvolemic  Follow-up not needed.          Vitals Value Taken Time   /64 9/27/2019  2:34 PM   Temp 36.8 °C (98.2 °F) 9/27/2019  2:00 PM   Pulse 86 9/27/2019  2:37 PM   Resp 16 9/27/2019  2:30 PM   SpO2 99 % 9/27/2019  2:37 PM   Vitals shown include unvalidated device data.      No case tracking events are documented in the log.      Pain/Mackenzie Score: No data recorded

## 2019-09-27 NOTE — PROGRESS NOTES
Assumed care of the mother from L & D. VSS. Significant other is at the bedside with mom and the .

## 2019-09-27 NOTE — OP NOTE
2019      Pre-op:  Term pregnancy    Post-op:  Term pregnancy    Procedure:      Indications: 24 y.o.  with IUP at 40w4d admitted in labor.  Patient progressed to C/+2.    Findings:  Large female infant, vertex presentation, APGARS 9 at 1 minute, 9 at 5 minutes, weight is pending.    Procedure:  Patient was placed in dorsal lithotomy position.  Patient was prepped and draped in a sterile fashion.  Patient began to push.  The vertex crowned and delivered.  The mouth and nose were suctioned with the bulb.  The remaining infant delivered without difficulty. The cord was cut and clamped.  The infant was vigorous upon delivery.  The infant was handed to the nurse.  Cord blood specimen was obtained.  The placenta delivered spontaneously and intact.  The patient tolerated the procedure well.  Patient will be monitored in recovery.    Laceration :  No  Episiotomy:  No  Forceps: No  Vacuum: No    EBL: 500 cc, treated with cytotec 800mcg rectal    Specimen:  Placenta sent No    Complications:  None    Piotr Wisdom MD

## 2019-09-28 LAB
ANISOCYTOSIS BLD QL SMEAR: ABNORMAL
BASOPHILS # BLD AUTO: 0.04 K/UL (ref 0–0.2)
BASOPHILS NFR BLD: 0.4 % (ref 0–1.9)
DIFFERENTIAL METHOD: ABNORMAL
EOSINOPHIL # BLD AUTO: 0.2 K/UL (ref 0–0.5)
EOSINOPHIL NFR BLD: 2 % (ref 0–8)
ERYTHROCYTE [DISTWIDTH] IN BLOOD BY AUTOMATED COUNT: 18.3 % (ref 11.5–14.5)
HCT VFR BLD AUTO: 28.7 % (ref 37–48.5)
HGB BLD-MCNC: 8.8 G/DL (ref 12–16)
HYPOCHROMIA BLD QL SMEAR: ABNORMAL
LYMPHOCYTES # BLD AUTO: 2.4 K/UL (ref 1–4.8)
LYMPHOCYTES NFR BLD: 23.7 % (ref 18–48)
MCH RBC QN AUTO: 20.9 PG (ref 27–31)
MCHC RBC AUTO-ENTMCNC: 30.7 G/DL (ref 32–36)
MCV RBC AUTO: 68 FL (ref 82–98)
MONOCYTES # BLD AUTO: 0.4 K/UL (ref 0.3–1)
MONOCYTES NFR BLD: 4 % (ref 4–15)
NEUTROPHILS # BLD AUTO: 7 K/UL (ref 1.8–7.7)
NEUTROPHILS NFR BLD: 70.9 % (ref 38–73)
PLATELET # BLD AUTO: 184 K/UL (ref 150–350)
PLATELET BLD QL SMEAR: ABNORMAL
PMV BLD AUTO: ABNORMAL FL (ref 9.2–12.9)
POIKILOCYTOSIS BLD QL SMEAR: SLIGHT
RBC # BLD AUTO: 4.21 M/UL (ref 4–5.4)
RPR SER QL: NORMAL
TOXIC GRANULES BLD QL SMEAR: PRESENT
WBC # BLD AUTO: 10.02 K/UL (ref 3.9–12.7)

## 2019-09-28 PROCEDURE — 25000003 PHARM REV CODE 250: Performed by: OBSTETRICS & GYNECOLOGY

## 2019-09-28 PROCEDURE — 36415 COLL VENOUS BLD VENIPUNCTURE: CPT

## 2019-09-28 PROCEDURE — 11000001 HC ACUTE MED/SURG PRIVATE ROOM

## 2019-09-28 PROCEDURE — 25000242 PHARM REV CODE 250 ALT 637 W/ HCPCS: Performed by: OBSTETRICS & GYNECOLOGY

## 2019-09-28 PROCEDURE — 85025 COMPLETE CBC W/AUTO DIFF WBC: CPT

## 2019-09-28 RX ORDER — DOCUSATE SODIUM 100 MG/1
100 CAPSULE, LIQUID FILLED ORAL 2 TIMES DAILY
Status: DISCONTINUED | OUTPATIENT
Start: 2019-09-28 | End: 2019-09-29 | Stop reason: HOSPADM

## 2019-09-28 RX ADMIN — IBUPROFEN 600 MG: 600 TABLET, FILM COATED ORAL at 08:09

## 2019-09-28 RX ADMIN — DOCUSATE SODIUM 100 MG: 100 CAPSULE, LIQUID FILLED ORAL at 09:09

## 2019-09-28 RX ADMIN — OXYCODONE HYDROCHLORIDE AND ACETAMINOPHEN 1 TABLET: 10; 325 TABLET ORAL at 12:09

## 2019-09-28 RX ADMIN — FLUTICASONE PROPIONATE 100 MCG: 50 SPRAY, METERED NASAL at 08:09

## 2019-09-28 RX ADMIN — FERROUS GLUCONATE TAB 324 MG (37.5 MG ELEMENTAL IRON) 324 MG: 324 (37.5 FE) TAB at 08:09

## 2019-09-28 RX ADMIN — IBUPROFEN 600 MG: 600 TABLET, FILM COATED ORAL at 06:09

## 2019-09-28 RX ADMIN — OXYCODONE HYDROCHLORIDE AND ACETAMINOPHEN 1 TABLET: 10; 325 TABLET ORAL at 07:09

## 2019-09-28 RX ADMIN — OXYCODONE HYDROCHLORIDE AND ACETAMINOPHEN 1 TABLET: 10; 325 TABLET ORAL at 05:09

## 2019-09-28 RX ADMIN — VITAMIN A, VITAMIN C, VITAMIN D-3, VITAMIN E, VITAMIN B-1, VITAMIN B-2, NIACIN, VITAMIN B-6, CALCIUM, IRON, ZINC, COPPER 1 TABLET: 4000; 120; 400; 22; 1.84; 3; 20; 10; 1; 12; 200; 27; 25; 2 TABLET ORAL at 08:09

## 2019-09-28 RX ADMIN — IBUPROFEN 600 MG: 600 TABLET, FILM COATED ORAL at 02:09

## 2019-09-28 NOTE — PROGRESS NOTES
Subjective:     No complaints.  Ambulating, voiding, tolerating PO.  Bleeding and pain are controlled.     Review of Systems  Nine system ROS negative    Objective:   Vitals:  Temp: 96.6 °F (35.9 °C) (09/28/19 1147)  Pulse: 78 (09/28/19 1147)  Resp: 20 (09/28/19 1147)  BP: 127/65 (09/28/19 1147)  SpO2: 96 % (09/28/19 1147)  Temp:  [96.6 °F (35.9 °C)-98.3 °F (36.8 °C)]   Pulse:  [68-85]   Resp:  [16-20]   BP: (122-149)/(58-84)   SpO2:  [96 %-99 %]   General: no acute distress, alert and oriented to person, place and time  Breasts: nontender, nonengorged  Abdomen: non-distended, non-tender to palpation, uterus firm and below the umbilicus  Pelvic: deferred, reported minimal uterine bleeding  Extremities: calves non-tender to palpation, no calf edema, erythema or palpable cords    Recent Labs   Lab 09/28/19  0504   WBC 10.02   HGB 8.8*   HCT 28.7*        Assessment:     Postpartum Day #1    Plan:     Routine postpartum care

## 2019-09-28 NOTE — PLAN OF CARE
Patient has NADN. Respirations unlabored. Regular meals tolerated with no complaints of nausea or vomiting. Patient IV removed per her request because she reported that it was hurting. H&H stable. Fundus firm one below with light lochia observed on assessment. Patient voids spontaneously. Patient has showered. Patient bonding with  and has been latching on her  for feeds. PRN motrin and percocet used to address complaints of pain during rounds. POC discussed with the patient, and she has verbalized understanding.

## 2019-09-28 NOTE — PLAN OF CARE
Problem: Breastfeeding  Goal: Effective Breastfeeding  Intervention: Promote Breast Care and Comfort  Flowsheets (Taken 9/28/2019 1703)     Mother exclusively breastfeeding infant

## 2019-09-28 NOTE — LACTATION NOTE
09/28/19 1623   Maternal Assessment   Breast Shape Bilateral:;round   Breast Density Bilateral:;soft   Areola Bilateral:;elastic   Nipples Bilateral:;everted   Maternal Infant Feeding   Maternal Emotional State relaxed;independent   Infant Positioning clutch/football   Signs of Milk Transfer audible swallow;infant jaw motion present   Pain with Feeding no   Comfort Measures Before/During Feeding infant position adjusted;latch adjusted;maternal position adjusted;suction broken using finger   Latch Assistance yes     Baby feeding on left breast in cradle position. Latch appears shallow and mother appears uncomfortable. Suction broken and assisted mother to reposition baby in football position. Assisted to latch baby deeply onto breast. Baby nursing well with audible swallows. Mother denies discomfort during feeding. Breastfeeding instructions reviewed and all questions answered. Encouraged mother to call me with any further lactation needs today. Mother verbalizes understanding of all instructions with good recall.

## 2019-09-28 NOTE — PLAN OF CARE
Patient maintained stable VS, no acute distress noted overnight. Fundus firm and midline, 2cm below umbilicus. Lochia moderate to light overnight. Patient voiding adequately and without difficulty. Pain manage well with PRN medications, pls see MAR. Encouraged breastfeeding infant 8-12 times a day - good latch noted, assisted patient as needed. Will continue care.

## 2019-09-29 VITALS
SYSTOLIC BLOOD PRESSURE: 139 MMHG | RESPIRATION RATE: 18 BRPM | HEIGHT: 66 IN | WEIGHT: 236 LBS | OXYGEN SATURATION: 98 % | DIASTOLIC BLOOD PRESSURE: 83 MMHG | TEMPERATURE: 97 F | HEART RATE: 75 BPM | BODY MASS INDEX: 37.93 KG/M2

## 2019-09-29 PROCEDURE — 25000003 PHARM REV CODE 250: Performed by: OBSTETRICS & GYNECOLOGY

## 2019-09-29 RX ORDER — IBUPROFEN 600 MG/1
600 TABLET ORAL EVERY 6 HOURS PRN
Qty: 30 TABLET | Refills: 0 | Status: SHIPPED | OUTPATIENT
Start: 2019-09-29 | End: 2020-09-22

## 2019-09-29 RX ORDER — OXYCODONE AND ACETAMINOPHEN 5; 325 MG/1; MG/1
1 TABLET ORAL EVERY 4 HOURS PRN
Qty: 20 TABLET | Refills: 0 | Status: SHIPPED | OUTPATIENT
Start: 2019-09-29 | End: 2020-05-26

## 2019-09-29 RX ADMIN — FERROUS GLUCONATE TAB 324 MG (37.5 MG ELEMENTAL IRON) 324 MG: 324 (37.5 FE) TAB at 08:09

## 2019-09-29 RX ADMIN — OXYCODONE HYDROCHLORIDE AND ACETAMINOPHEN 1 TABLET: 10; 325 TABLET ORAL at 08:09

## 2019-09-29 RX ADMIN — OXYCODONE HYDROCHLORIDE AND ACETAMINOPHEN 1 TABLET: 10; 325 TABLET ORAL at 12:09

## 2019-09-29 RX ADMIN — IBUPROFEN 600 MG: 600 TABLET, FILM COATED ORAL at 12:09

## 2019-09-29 RX ADMIN — DOCUSATE SODIUM 100 MG: 100 CAPSULE, LIQUID FILLED ORAL at 08:09

## 2019-09-29 RX ADMIN — VITAMIN A, VITAMIN C, VITAMIN D-3, VITAMIN E, VITAMIN B-1, VITAMIN B-2, NIACIN, VITAMIN B-6, CALCIUM, IRON, ZINC, COPPER 1 TABLET: 4000; 120; 400; 22; 1.84; 3; 20; 10; 1; 12; 200; 27; 25; 2 TABLET ORAL at 08:09

## 2019-09-29 NOTE — DISCHARGE INSTRUCTIONS
After a Vaginal Birth    General Discharge Instructions    · May follow a regular diet, unless otherwise discussed with physician.    · Take showers, not baths unless otherwise discussed with physician.    · Activity as tolerated.    · No lifting or heavy exercise for 6 weeks, no driving for 2 weeks, no sexual intercourse, douching or tampons for 6 weeks    · May return to work/school as discussed with physician  · Take medications as directed    · Discuss birth control with physician    · Breast care support bra worn at all times    · Lactation consultant referral number ( [phone] or [phone])    Call Your Healthcare Provider Right Away If You Have:  · A temperature of 100.4°F or higher.  · If your blood pressure is over 155/105.  · You have difficulty catching your breath or trouble breathing.  · Heavy vaginal bleeding, clots, or vaginal discharge with foul odor. (heavier than menses)  · Persistent nausea or vomiting.  · You gain more than 3 pounds in 3 days.  · Severe headaches not relieved by Tylenol (acetaminophen) or Motrin (ibuprofen)  · Blurry or double vision, see spots or flashing lights.  · Dizziness or fainting.  · New onset swelling or worsening of existing swelling.  · Burning or pain when you urinate.  · No bowel movement for 5 days.  · Redness, warmth, swelling, or pain in the lower leg.  · Redness, discharge, or pain worse than you had in the hospital.  · Burning, pain, red streaks, or lumpy areas in your breasts.  · Cracks, blisters, or blood on your nipples.  · Feelings of extreme sadness or anxiety, or a feeling that you dont want to be with your baby.  · If you have any new or unusual symptoms or have questions or concerns    Some of these symptoms can occur up to 4 to 6 weeks after delivery. This can be a sign of pre-eclampsia, which is a serious disease that can cause stroke, seizures, organ damage, or death. Do not wait to call your doctor or seek medical attention.            If  You Had Stitches  You may have received stitches in the skin near your vagina. The stitches might have closed an episiotomy (an incision that enlarges the opening of the vagina). Or you may have needed stitches to repair torn skin. Either way, your stitches should dissolve within weeks. Until then, you can help reduce discomfort, aid healing, and reduce your risk of infection by keeping the stitches clean. These tips can help:    · Gently wipe from front to back after you urinate or have a bowel movement.  · After wiping, spray warm water on the area. Or you can have a sitz bath. This means sitting in a tub with a few inches of water in it. Then pat the area dry or use a hairdryer on a cool setting.  · You can take a shower instead of a bath.  · Change sanitary pads at least every 2-4 hours.  · Place cold or heat packs on the area as directed by your doctors or nurses. Keep a thin towel between the pack and your skin.  · Sit on firm seats so the stitches pull less.     Follow-Up  Schedule a  follow-up exam with your healthcare provider for about 6 weeks after delivery. During this exam, your uterus and vaginal area will be checked. Contact your healthcare provider if you think you or your baby are having any problems.        Breastfeeding discharge instructions given with First Alert form and reviewed.  Also discussed:   AAP recommendation of exclusive breastfeeding for the first 6 months of life and continued breastfeeding with the introduction of supplemental foods beyond the first year of life.  Instructed on the recommendation to delay all bottle and pacifier use until after 4 weeks of age and breastfeeding is well established.  Discussed the benefits of exclusive breastfeeding for both mother and baby.  Discussed the risks of supplementation/pacifier use on the exclusivity of breastfeeding in the first 6 months. Feed the baby at the earliest sign of hunger or comfort  o Hands to mouth,  sucking motions  o Rooting or searching for something to suck on  o Dont wait for crying - it is a not a late sign of hunger; it is a sign of distress     The feedings may be 8-12 times per 24hrs and will not follow a schedule   Alternate the breast you start the feeding with, or start with the breast that feels the fullest   Switch breasts when the baby takes himself off the breast or falls asleep   Keep offering breasts until the baby looks full, no longer gives hunger signs, and stays asleep when placed on his back in the crib   If the baby is sleepy and wont wake for a feeding, put the baby skin-to-skin dressed in a diaper against the mothers bare chest   Sleep near your baby   The baby should be positioned and latched on to the breast correctly  o Chest-to-chest, chin in the breast  o Babys lips are flipped outward  o Babys mouth is stretched open wide like a shout  o Babys sucking should feel like tugging to the mother  - The baby should be drinking at the breast:  o You should hear swallowing or gulping throughout the feeding  o You should see milk on the babys lips when he comes off the breast  o Your breasts should be softer when the baby is finished feeding  o The baby should look relaxed at the end of feedings  o After the 4th day and your milk is in:  o The babys poop should turn bright yellow and be loose, watery, and seedy  o The baby should have at least 3-4 poops the size of the palm of your hand per day  o The baby should have at least 6-8 wet diapers per day  o The urine should be light yellow in color  You should drink when you are thirsty and eat a healthy diet when you are    hungry.     Take naps to get the rest you need.   Take medications and/or drink alcohol only with permission of your obstetrician    or the babys pediatrician.  You can also call the [facility],   ([phone]), Monday-Friday, 8am-5pm Central time, to get the most   up-to-date evidence-based  information on the use of medications during   pregnancy and breastfeeding.      The baby should be examined by a pediatrician at 3-5 days of age; unless ordered sooner by the pediatrician.  Once your milk comes in, the baby should be back to birth weight no later than 10-14 days of age.

## 2019-09-29 NOTE — CONSULTS
09/29/19 1120   Discharge Assessment   Assessment Type Discharge Planning Assessment   Confirmed/corrected address and phone number on facesheet? Yes   Assessment information obtained from? Patient;Other  (Significant other-Ramu)   Communicated expected length of stay with patient/caregiver yes   Prior to hospitilization cognitive status: Alert/Oriented   Prior to hospitalization functional status: Independent   Current cognitive status: Alert/Oriented   Current Functional Status: Independent   Facility Arrived From: Home   Lives With significant other   Able to Return to Prior Arrangements yes   Is patient able to care for self after discharge? Yes   Who are your caregiver(s) and their phone number(s)? Ramu-signifiant other: [phone]; Torito-father: [phone]   Patient's perception of discharge disposition home or selfcare   Readmission Within the Last 30 Days no previous admission in last 30 days   Patient currently being followed by outpatient case management? No   Patient currently receives any other outside agency services? No   Equipment Currently Used at Home none   Do you have any problems affording any of your prescribed medications? No   Is the patient taking medications as prescribed? yes   Does the patient have transportation home? Yes   Transportation Anticipated family or friend will provide   Does the patient receive services at the [facility]? No   Discharge Plan A Home with family   Discharge Plan B Other  (TBD)   DME Needed Upon Discharge  other (see comments)  (TBD)   Patient/Family in Agreement with Plan yes   SW Role explained to patient; two patient identifiers recognized; SW contact information placed on Communication board. Discussed patient managing health care at home; determined who would be helping patient at home with recovery: Ramu-significant other and Torito-father will help with recovery at home    [facility]    Extended Emergency Contact  Information  Primary Emergency Contact: Torito Welch   [facility]  Home Phone: [phone]  Mobile Phone: [phone]  Relation: Father  Secondary Emergency Contact: Jimena Welch   [place]  Mobile Phone: [phone]  Relation: Mother     Pharmacy: [facility] [address]  [place]     Payor: MEDICAID / Plan: [facility] COMMUNITY PLAN [facility] ([place] MEDICAID) / Product Type: Managed Medicaid /      Patient reported that they have all needed equipment and supplies for their infant; they have been raising a son, currently 2 years old. Patient has good strong family support, and infant's father is present and will be involved in raising the baby. Patient will look into resources for possible post=pardem which she had with first child at her current OB clinic; resource was also provided for [facility] in [place], [place]

## 2019-09-29 NOTE — LACTATION NOTE
09/29/19 0829   Maternal Assessment   Breast Density Bilateral:;soft;filling   Areola Bilateral:;elastic   Nipples Bilateral:;everted   Maternal Infant Feeding   Maternal Emotional State independent;relaxed   Infant Positioning clutch/football   Signs of Milk Transfer audible swallow;infant jaw motion present   Pain with Feeding no   Latch Assistance no   mother with baby cueing to feed -latches easily independently -strong sucking with swallows noted -mother denies discomfort with feeding- states breasts starting to fill and ready for discharge today

## 2019-09-29 NOTE — NURSING
Assisted out to car with baby in arms.Placed in car per mom. Dad with all personal belongings.@ SIDE.

## 2019-09-29 NOTE — PLAN OF CARE
Pt progressing well. NAD noted. VSS. Pain well controlled. POC discussed with pt. Understanding verbalized.

## 2019-09-29 NOTE — LACTATION NOTE
Review breastfeeding discharge information with parents now -aware of need to monitor wet and dirty diapers over next few days --referred to breastfeeding guide for community resources and all question answered -states understanding  of all information

## 2019-09-29 NOTE — NURSING
Discharge teaching completed. Questions answered. Verbalizes understanding. [facility] [address] [facility] Meds.prescribed to her.

## 2019-10-01 ENCOUNTER — TELEPHONE (OUTPATIENT)
Dept: OBSTETRICS AND GYNECOLOGY | Facility: HOSPITAL | Age: 24
End: 2019-10-01

## 2019-10-02 ENCOUNTER — TELEPHONE (OUTPATIENT)
Dept: OBSTETRICS AND GYNECOLOGY | Facility: HOSPITAL | Age: 24
End: 2019-10-02

## 2019-10-02 NOTE — TELEPHONE ENCOUNTER
"Lactation Telephone Follow up:  Spoke with pt.  Reports breastfeeding well on cue at least 8 - 10 times in 24 hours.  Pumps occasionally for engorgement relief.  Reports 4+ wets and 4 large yellow/seedy stools in last 24 hours.  Denies any c/o or concerns.  Has ped appt scheduled for Friday, instructed to keep as scheduled.  Hotline # given.  States "understand".  "

## 2020-04-21 DIAGNOSIS — Z01.84 ANTIBODY RESPONSE EXAMINATION: ICD-10-CM

## 2020-04-29 ENCOUNTER — LAB VISIT (OUTPATIENT)
Dept: LAB | Facility: HOSPITAL | Age: 25
End: 2020-04-29
Attending: INTERNAL MEDICINE
Payer: COMMERCIAL

## 2020-04-29 DIAGNOSIS — Z01.84 ANTIBODY RESPONSE EXAMINATION: ICD-10-CM

## 2020-04-29 LAB — SARS-COV-2 IGG SERPLBLD QL IA.RAPID: NEGATIVE

## 2020-04-29 PROCEDURE — 36415 COLL VENOUS BLD VENIPUNCTURE: CPT

## 2020-04-29 PROCEDURE — 86769 SARS-COV-2 COVID-19 ANTIBODY: CPT

## 2020-05-26 ENCOUNTER — OFFICE VISIT (OUTPATIENT)
Dept: INTERNAL MEDICINE | Facility: CLINIC | Age: 25
End: 2020-05-26
Payer: COMMERCIAL

## 2020-05-26 VITALS
BODY MASS INDEX: 33.91 KG/M2 | OXYGEN SATURATION: 99 % | DIASTOLIC BLOOD PRESSURE: 78 MMHG | HEIGHT: 66 IN | SYSTOLIC BLOOD PRESSURE: 118 MMHG | HEART RATE: 70 BPM | WEIGHT: 211 LBS

## 2020-05-26 DIAGNOSIS — D56.3 ALPHA THALASSEMIA MINOR: ICD-10-CM

## 2020-05-26 DIAGNOSIS — R55 PRE-SYNCOPE: ICD-10-CM

## 2020-05-26 DIAGNOSIS — R53.83 FATIGUE, UNSPECIFIED TYPE: ICD-10-CM

## 2020-05-26 DIAGNOSIS — D50.9 MICROCYTIC ANEMIA: Primary | ICD-10-CM

## 2020-05-26 PROCEDURE — 99204 PR OFFICE/OUTPT VISIT, NEW, LEVL IV, 45-59 MIN: ICD-10-PCS | Mod: S$GLB,,, | Performed by: STUDENT IN AN ORGANIZED HEALTH CARE EDUCATION/TRAINING PROGRAM

## 2020-05-26 PROCEDURE — 99215 OFFICE O/P EST HI 40 MIN: CPT | Mod: PBBFAC | Performed by: STUDENT IN AN ORGANIZED HEALTH CARE EDUCATION/TRAINING PROGRAM

## 2020-05-26 PROCEDURE — 99999 PR PBB SHADOW E&M-EST. PATIENT-LVL V: CPT | Mod: PBBFAC,,, | Performed by: STUDENT IN AN ORGANIZED HEALTH CARE EDUCATION/TRAINING PROGRAM

## 2020-05-26 PROCEDURE — 99999 PR PBB SHADOW E&M-EST. PATIENT-LVL V: ICD-10-PCS | Mod: PBBFAC,,, | Performed by: STUDENT IN AN ORGANIZED HEALTH CARE EDUCATION/TRAINING PROGRAM

## 2020-05-26 PROCEDURE — 99204 OFFICE O/P NEW MOD 45 MIN: CPT | Mod: S$GLB,,, | Performed by: STUDENT IN AN ORGANIZED HEALTH CARE EDUCATION/TRAINING PROGRAM

## 2020-05-26 NOTE — PROGRESS NOTES
INTERNAL MEDICINE RESIDENT CLINIC  CLINIC NOTE    Patient Name: Jessica Welch  YOB: 1995    PRESENTING HISTORY   Chief Complaint: Fainting    History of Present Illness:  Ms. Jessica Welch is a 25 y.o. female with history of alpha thalassemia minor, delivery of last pregnancy 9/2019.     Here with near fainting spells. First started during her first pregnancy in 2017. Increased in frequency during last pregnancy. Has been happening about monthly since her pregnancy ended, last occurred one week ago. 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 She felt weak and light headed while in full PPE and had to leave the room and sit down. Has never lost consciousness. Previous episodes have occurred with prolonged standing, walking, positional changes. No patterns noticed. No chest pain, shortness of breath or palpitations.     Hx anemia. Received iron transfusions during last pregnancy. Did not tolerate PO iron due to constipation. Has never had blood transfusion. Heavy menses recently, lasted 7 days. 1st period since pregnancy.     Patient is currently breastfeeding. Interested in IUD for contraception, needs to establish with a new OBGYN.     Complains of post partum depression. No history of depression. Mother has schizophrenia and she is very concerned about pharmacotherapy. No SI/HI/hallucinations. Not interfering with her work or childcare. This will need further assessment at next visit.     Has several other complaints including chronic headaches (at baseline since childhood), eczema, few episodes of loose stools which will also need to be addressed at follow up.     Review of Systems:  Constitutional: no fever or chills  Eyes: no visual changes or aura  ENT: no nasal congestion or sore throat. + oral pains  Respiratory: no cough or shortness of breath  Cardiovascular: no chest pain or palpitations  Gastrointestinal: no nausea or vomiting, no abdominal  pain  Genitourinary: no hematuria or dysuria  Musculoskeletal: no arthralgias or myalgias  Neurological: no syncope + headaches  Skin: + intermittent rash to jose arms, chest    PAST HISTORY:     Past Medical History:   Diagnosis Date    alpha thalassemia     Asthma     Asthma - childhood 8/6/2019    Headache, migraine        History reviewed. No pertinent surgical history.    Family History   Problem Relation Age of Onset    Breast cancer Neg Hx     Colon cancer Neg Hx     Ovarian cancer Neg Hx        Social History     Socioeconomic History    Marital status: Single     Spouse name: Not on file    Number of children: Not on file    Years of education: Not on file    Highest education level: Not on file   Occupational History    Not on file   Social Needs    Financial resource strain: Not on file    Food insecurity:     Worry: Not on file     Inability: Not on file    Transportation needs:     Medical: Not on file     Non-medical: Not on file   Tobacco Use    Smoking status: Never Smoker    Smokeless tobacco: Never Used   Substance and Sexual Activity    Alcohol use: No    Drug use: No    Sexual activity: Yes     Partners: Male     Birth control/protection: None   Lifestyle    Physical activity:     Days per week: Not on file     Minutes per session: Not on file    Stress: Not on file   Relationships    Social connections:     Talks on phone: Not on file     Gets together: Not on file     Attends Hoahaoism service: Not on file     Active member of club or organization: Not on file     Attends meetings of clubs or organizations: Not on file     Relationship status: Not on file   Other Topics Concern    Not on file   Social History Narrative    Not on file       MEDICATIONS & ALLERGIES:     Current Outpatient Medications on File Prior to Visit   Medication Sig    ibuprofen (ADVIL,MOTRIN) 600 MG tablet Take 1 tablet (600 mg total) by mouth every 6 (six) hours as needed.    [DISCONTINUED]  "ferrous gluconate (FERGON) 324 MG tablet Take 1 tablet (324 mg total) by mouth daily with breakfast.    [DISCONTINUED] oxyCODONE-acetaminophen (PERCOCET) 5-325 mg per tablet Take 1 tablet by mouth every 4 (four) hours as needed.    [DISCONTINUED] prenatal vit,barbara 74-iron-folic 27 mg iron- 1 mg Tab Take 1 tablet by mouth once daily.     No current facility-administered medications on file prior to visit.        Review of patient's allergies indicates:  No Known Allergies    OBJECTIVE:   Vital Signs:  Vitals:    05/26/20 1445 05/26/20 1500 05/26/20 1505   BP: 110/72 114/76 118/78   Pulse: 77 68 70   SpO2: 99%     Weight: 95.7 kg (210 lb 15.7 oz)     Height: 5' 6" (1.676 m)         No results found for this or any previous visit (from the past 24 hour(s)).      Physical Exam:   General:  Well appearing. No distress  HEENT:  Pale conjunctivae. PERRL. Normal EOM. No oral lesions. Throat clear without erythema or exudates  Neck: No carotid bruits. No thyromegaly.   CVS:  RRR, no murmurs. No orthostasis (see vitals above).   Resp:  Lungs clear to auscultation, no wheezes, rales  GI:  Abdomen soft, non-tender, non-distended  MSK:  No peripheral edema  Neuro:  No facial droop. No gross motor/sensory deficits. Normal gait.   Psych:  Alert and oriented to person, place, and time. Well groomed.     ASSESSMENT & PLAN:     25 F with alpha thalassemia, recent pregnancy here with multiple complaints. Today addressing pre-syncope. Evaluation with EKG, CBC, electrolytes, iron studies. Also checking TSH.     At next visit: chronic headaches, depression, eczema, establish care.     Microcytic anemia  -     CBC auto differential; Future  -     Iron and TIBC; Future  -     Ferritin; Future  -     Ambulatory referral/consult to Obstetrics / Gynecology; Future    Fatigue, unspecified type  -     TSH; Future  -     Basic metabolic panel; Future    Alpha thalassemia minor  -     CBC auto differential; Future    Pre-syncope  -     EKG " 12-lead; Future    Post- depression  -     Ambulatory referral/consult to Psychology; Future    Discussed with Dr. Castillo. RTC in 3 weeks for above and to establish care.     Shiv Arguello MD   Internal Medicine PGY-3

## 2020-05-27 ENCOUNTER — TELEPHONE (OUTPATIENT)
Dept: INTERNAL MEDICINE | Facility: CLINIC | Age: 25
End: 2020-05-27

## 2020-05-27 DIAGNOSIS — D50.0 IRON DEFICIENCY ANEMIA DUE TO CHRONIC BLOOD LOSS: Primary | ICD-10-CM

## 2020-05-27 RX ORDER — BISACODYL 5 MG
5 TABLET, DELAYED RELEASE (ENTERIC COATED) ORAL DAILY
Qty: 90 TABLET | Refills: 3 | COMMUNITY
Start: 2020-05-27 | End: 2020-06-03 | Stop reason: SDUPTHER

## 2020-05-27 RX ORDER — FERROUS SULFATE 325(65) MG
325 TABLET, DELAYED RELEASE (ENTERIC COATED) ORAL 2 TIMES DAILY
Qty: 180 TABLET | Refills: 3 | Status: SHIPPED | OUTPATIENT
Start: 2020-05-27 | End: 2020-06-03 | Stop reason: SDUPTHER

## 2020-05-27 RX ORDER — POLYETHYLENE GLYCOL 3350 17 G/17G
17 POWDER, FOR SOLUTION ORAL DAILY PRN
Qty: 30 EACH | Refills: 3 | Status: SHIPPED | OUTPATIENT
Start: 2020-05-27 | End: 2020-06-03 | Stop reason: SDUPTHER

## 2020-05-27 NOTE — TELEPHONE ENCOUNTER
Called patient and discussed test results. Iron deficiency anemia on chronic alpha thalassemia. Suspect component of heavy periods. Supplementing iron. Issues with constipation in past. Scheduled dulcolax, prn miralax for constipation. Patient understands and agrees with the plan. No questions voiced.       Shiv Arguello MD   Internal Medicine PGY-3  [phone]

## 2020-05-29 NOTE — PROGRESS NOTES
I have reviewed the notes, assessments, and/or procedures performed by Dr. Arguello, I concur with his documentation of Jessica Welch.

## 2020-06-02 ENCOUNTER — PATIENT MESSAGE (OUTPATIENT)
Dept: INTERNAL MEDICINE | Facility: CLINIC | Age: 25
End: 2020-06-02

## 2020-06-02 DIAGNOSIS — D50.0 IRON DEFICIENCY ANEMIA DUE TO CHRONIC BLOOD LOSS: ICD-10-CM

## 2020-06-03 RX ORDER — BISACODYL 5 MG
5 TABLET, DELAYED RELEASE (ENTERIC COATED) ORAL DAILY
Qty: 90 TABLET | Refills: 3 | COMMUNITY
Start: 2020-06-03 | End: 2020-09-22

## 2020-06-03 RX ORDER — POLYETHYLENE GLYCOL 3350 17 G/17G
17 POWDER, FOR SOLUTION ORAL DAILY PRN
Qty: 30 EACH | Refills: 3 | Status: SHIPPED | OUTPATIENT
Start: 2020-06-03 | End: 2020-09-22

## 2020-06-03 RX ORDER — FERROUS SULFATE 325(65) MG
325 TABLET, DELAYED RELEASE (ENTERIC COATED) ORAL 2 TIMES DAILY
Qty: 180 TABLET | Refills: 3 | Status: SHIPPED | OUTPATIENT
Start: 2020-06-03 | End: 2020-09-22

## 2020-09-22 ENCOUNTER — TELEPHONE (OUTPATIENT)
Dept: INTERNAL MEDICINE | Facility: CLINIC | Age: 25
End: 2020-09-22

## 2020-09-22 ENCOUNTER — OFFICE VISIT (OUTPATIENT)
Dept: INTERNAL MEDICINE | Facility: CLINIC | Age: 25
End: 2020-09-22
Payer: COMMERCIAL

## 2020-09-22 VITALS
WEIGHT: 229.75 LBS | OXYGEN SATURATION: 98 % | HEIGHT: 66 IN | DIASTOLIC BLOOD PRESSURE: 80 MMHG | HEART RATE: 80 BPM | BODY MASS INDEX: 36.92 KG/M2 | SYSTOLIC BLOOD PRESSURE: 116 MMHG

## 2020-09-22 DIAGNOSIS — M54.40 LOW BACK PAIN OF MULTIPLE SITES OF SPINE WITH SCIATICA: Primary | ICD-10-CM

## 2020-09-22 DIAGNOSIS — D50.0 IRON DEFICIENCY ANEMIA DUE TO CHRONIC BLOOD LOSS: ICD-10-CM

## 2020-09-22 DIAGNOSIS — D56.3 ALPHA THALASSEMIA MINOR: ICD-10-CM

## 2020-09-22 PROBLEM — Z34.90 PREGNANCY WITH ONE FETUS: Status: RESOLVED | Noted: 2019-09-27 | Resolved: 2020-09-22

## 2020-09-22 PROCEDURE — 99999 PR PBB SHADOW E&M-EST. PATIENT-LVL IV: CPT | Mod: PBBFAC,,, | Performed by: INTERNAL MEDICINE

## 2020-09-22 PROCEDURE — 3008F PR BODY MASS INDEX (BMI) DOCUMENTED: ICD-10-PCS | Mod: CPTII,S$GLB,, | Performed by: INTERNAL MEDICINE

## 2020-09-22 PROCEDURE — 99214 PR OFFICE/OUTPT VISIT, EST, LEVL IV, 30-39 MIN: ICD-10-PCS | Mod: S$GLB,,, | Performed by: INTERNAL MEDICINE

## 2020-09-22 PROCEDURE — 99999 PR PBB SHADOW E&M-EST. PATIENT-LVL IV: ICD-10-PCS | Mod: PBBFAC,,, | Performed by: INTERNAL MEDICINE

## 2020-09-22 PROCEDURE — 3008F BODY MASS INDEX DOCD: CPT | Mod: CPTII,S$GLB,, | Performed by: INTERNAL MEDICINE

## 2020-09-22 PROCEDURE — 99214 OFFICE O/P EST MOD 30 MIN: CPT | Mod: S$GLB,,, | Performed by: INTERNAL MEDICINE

## 2020-09-22 RX ORDER — MELOXICAM 15 MG/1
15 TABLET ORAL DAILY PRN
Qty: 90 TABLET | Refills: 1 | Status: SHIPPED | OUTPATIENT
Start: 2020-09-22

## 2020-09-22 RX ORDER — FERROUS SULFATE 325(65) MG
325 TABLET, DELAYED RELEASE (ENTERIC COATED) ORAL DAILY
Qty: 180 TABLET | Refills: 3 | COMMUNITY
Start: 2020-09-22

## 2020-09-22 RX ORDER — TIZANIDINE 4 MG/1
4 TABLET ORAL 3 TIMES DAILY PRN
Qty: 60 TABLET | Refills: 2 | Status: SHIPPED | OUTPATIENT
Start: 2020-09-22

## 2020-09-22 NOTE — PROGRESS NOTES
INTERNAL MEDICINE CLINIC    Progress Note    PRESENTING HISTORY     Previous PCP: [facility] Kwesi Middleton    Chief Complaint/Reason for Visit:  Annual Physical    History of Present Illness & ROS : Ms. Jessica Welch is a 25 y.o. female.      She complains of back pain for few months.  On and off since delivery of daughter 1 year ago.  Some numbness to lower back.  Pressure in neck too.    Walking normal.    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      PAST HISTORY:     Past Medical History:   Diagnosis Date    Alpha thalassemia minor 6/22/2016    Asthma - childhood 8/6/2019       No past surgical history on file.    Family History   Problem Relation Age of Onset    Schizophrenia Mother     Breast cancer Neg Hx     Colon cancer Neg Hx     Ovarian cancer Neg Hx        Social History     Socioeconomic History    Marital status: Single     Spouse name: Not on file    Number of children: 2    Years of education: Not on file    Highest education level: Not on file   Occupational History    Occupation: medical assistant     Employer: [employer]     Comment: dermatology clinic at [employer]   Social Needs    Financial resource strain: Not on file    Food insecurity     Worry: Not on file     Inability: Not on file    Transportation needs     Medical: Not on file     Non-medical: Not on file   Tobacco Use    Smoking status: Never Smoker    Smokeless tobacco: Never Used   Substance and Sexual Activity    Alcohol use: No    Drug use: No    Sexual activity: Yes     Partners: Male     Birth control/protection: None   Lifestyle    Physical activity     Days per week: Not on file     Minutes per session: Not on file    Stress: Not on file   Relationships    Social connections     Talks on phone: Not on file     Gets together: Not on file     Attends Church service: Not on file     Active member of club or organization: Not on file     Attends meetings of clubs or  organizations: Not on file     Relationship status: Not on file   Other Topics Concern    Not on file   Social History Narrative    Not on file       MEDICATIONS & ALLERGIES:     Current Outpatient Medications on File Prior to Visit   Medication Sig Dispense Refill            ferrous sulfate 325 (65 FE) MG EC tablet Take 1 tablet (325 mg total) by mouth 2 (two) times daily. Take with Vitamin C or orange juice 180 tablet 3       Medications Reconciliation:   I have reconciled the patient's home medications with the patient/family. I have updated all changes.  Refer to After-Visit Medication List.    OBJECTIVE:     Vital Signs:  Vitals:    09/22/20 1303   BP: 116/80   Pulse: 80     Wt Readings from Last 1 Encounters:   09/22/20 1303 104.2 kg (229 lb 11.5 oz)     Body mass index is 37.08 kg/m².     Physical Exam:  General: Well developed, well nourished. No distress.  HEENT: Head is normocephalic, atraumatic   Eyes: Clear conjunctiva.  Neck: Supple, symmetrical neck; trachea midline.  Lungs: Clear to auscultation bilaterally and normal respiratory effort.  Cardiovascular: Heart with regular rate and rhythm.    Extremities: No LE edema.    Abdomen: Abdomen is soft, non-tender non-distended   Skin: Skin color, texture, turgor normal. No rashes.  Musculoskeletal: Normal gait.   Lymph Nodes: No cervical or supraclavicular adenopathy.  Neurologic: Normal strength and tone. No focal numbness or weakness.   DTR normal lower extremity bilateral.  Slight positive straight leg raising to right leg.  Psychiatric: Normal affect. Alert.    Laboratory  Lab Results   Component Value Date    WBC 8.83 05/26/2020    HGB 9.6 (L) 05/26/2020    HCT 34.1 (L) 05/26/2020     05/26/2020    ALT 13 01/14/2017    AST 11 01/14/2017     05/26/2020    K 3.6 05/26/2020     05/26/2020    CREATININE 0.6 05/26/2020    BUN 8 05/26/2020    CO2 25 05/26/2020    TSH 0.832 05/26/2020    INR 0.9 10/09/2016    HGBA1C 97.9 05/24/2016        ASSESSMENT & PLAN:   dical, surgical, social, medication and allergy histories were obtained during this visit.    Low back pain of multiple sites of spine with sciatica  -     Ambulatory referral/consult to Back & Spine Clinic; Future; Expected date: 09/29/2020  -     tiZANidine (ZANAFLEX) 4 MG tablet; Take 1 tablet (4 mg total) by mouth 3 (three) times daily as needed.  Dispense: 60 tablet; Refill: 2  -     meloxicam (MOBIC) 15 MG tablet; Take 1 tablet (15 mg total) by mouth daily as needed for Pain. Take with food  Dispense: 90 tablet; Refill: 1    - Discussed adjusting monitor, desk and chair height.     Weight loss - Bariatric med referral.    Iron deficiency anemia due to chronic blood loss  Alpha thalassemia minor  -     ferrous sulfate 325 (65 FE) MG EC tablet; Take 1 tablet (325 mg total) by mouth once daily. Take with Vitamin C or orange juice  Dispense: 180 tablet; Refill: 3      BMI 37.0-37.9, adult  -     Ambulatory referral/consult to Bariatric Medicine; Future; Expected date: 09/29/2020    Scheduled Follow-up :  No future appointments.    After Visit Medication List :     Medication List          Accurate as of September 22, 2020  1:24 PM. If you have any questions, ask your nurse or doctor.            START taking these medications    meloxicam 15 MG tablet  Commonly known as: MOBIC  Take 1 tablet (15 mg total) by mouth daily as needed for Pain. Take with food  Started by: Edgar Dominguez MD     tiZANidine 4 MG tablet  Commonly known as: ZANAFLEX  Take 1 tablet (4 mg total) by mouth 3 (three) times daily as needed.  Started by: Edgar Dominguez MD        CHANGE how you take these medications    ferrous sulfate 325 (65 FE) MG EC tablet  Take 1 tablet (325 mg total) by mouth once daily. Take with Vitamin C or orange juice  What changed: when to take this  Changed by: Edgar Dominguez MD        STOP taking these medications    bisacodyL 5 mg EC tablet  Commonly known as: DULCOLAX  Stopped by: Edgar CROWELL  MD Alberto     ibuprofen 600 MG tablet  Commonly known as: ADVIL,MOTRIN  Stopped by: Edgar Dominguez MD     polyethylene glycol 17 gram Pwpk  Commonly known as: GLYCOLAX  Stopped by: Edgar Dominguez MD           Where to Get Your Medications      These medications were sent to [facility] [address] [place] [address] - [address] [facility] [address]    Phone: [phone]   · meloxicam 15 MG tablet  · tiZANidine 4 MG tablet     You can get these medications from any pharmacy    You don't need a prescription for these medications  · ferrous sulfate 325 (65 FE) MG EC tablet         Signing Physician:  Edgar Dominguez MD

## 2020-09-22 NOTE — TELEPHONE ENCOUNTER
----- Message from Edgar Dominguez MD sent at 9/22/2020  4:25 PM CDT -----  Regarding: RE: referral  I already placed back and spine clinic referral during the visit.  What szymanski she need?  ----- Message -----  From: Yi Ryan MA  Sent: 9/22/2020   4:22 PM CDT  To: Edgar Dominguez MD  Subject: FW: referral                                       ----- Message -----  From: Jennifer Calvin LPN  Sent: 9/22/2020   4:00 PM CDT  To: [facility] Staff  Subject: referral                                         Patient is scheduled in the Back and Spine Clinic with Valeria Pride PA-C in [place] and she needs a referral to be seen 9-24-20. Thank you,  Jennifer Calvin L.P.N.

## 2020-09-24 ENCOUNTER — OFFICE VISIT (OUTPATIENT)
Dept: SPINE | Facility: CLINIC | Age: 25
End: 2020-09-24
Payer: COMMERCIAL

## 2020-09-24 VITALS
WEIGHT: 229.75 LBS | HEIGHT: 66 IN | SYSTOLIC BLOOD PRESSURE: 116 MMHG | BODY MASS INDEX: 36.92 KG/M2 | DIASTOLIC BLOOD PRESSURE: 79 MMHG | HEART RATE: 76 BPM

## 2020-09-24 DIAGNOSIS — M54.2 NECK PAIN: ICD-10-CM

## 2020-09-24 DIAGNOSIS — G89.29 CHRONIC BILATERAL LOW BACK PAIN WITHOUT SCIATICA: Primary | ICD-10-CM

## 2020-09-24 DIAGNOSIS — M54.40 LOW BACK PAIN OF MULTIPLE SITES OF SPINE WITH SCIATICA: ICD-10-CM

## 2020-09-24 DIAGNOSIS — M54.50 CHRONIC BILATERAL LOW BACK PAIN WITHOUT SCIATICA: Primary | ICD-10-CM

## 2020-09-24 PROCEDURE — 99999 PR PBB SHADOW E&M-EST. PATIENT-LVL IV: ICD-10-PCS | Mod: PBBFAC,,, | Performed by: PHYSICIAN ASSISTANT

## 2020-09-24 PROCEDURE — 99499 UNLISTED E&M SERVICE: CPT | Mod: S$GLB,,, | Performed by: PHYSICIAN ASSISTANT

## 2020-09-24 PROCEDURE — 99499 NO LOS: ICD-10-PCS | Mod: S$GLB,,, | Performed by: PHYSICIAN ASSISTANT

## 2020-09-24 PROCEDURE — 97750 PHYSICAL PERFORMANCE TEST: CPT | Mod: 32,PN | Performed by: PHYSICIAN ASSISTANT

## 2020-09-24 PROCEDURE — 99999 PR PBB SHADOW E&M-EST. PATIENT-LVL IV: CPT | Mod: PBBFAC,,, | Performed by: PHYSICIAN ASSISTANT

## 2020-09-24 NOTE — LETTER
September 24, 2020      Edgar Dominguez MD  [address]  [facility] [address]           [facility]  [address] [place] [address]  Phone: [phone]  Fax: [phone]          Patient: Jessica Welch   MR Number: [id number]   YOB: 1995   Date of Visit: 9/24/2020       Dear Dr. Edgar Dominguez:    Thank you for referring Jessica Welch to me for evaluation. Attached you will find relevant portions of my assessment and plan of care.    If you have questions, please do not hesitate to call me. I look forward to following Jessica Welch along with you.    Sincerely,    Valeria Pride PA-C    Enclosure  CC:  No Recipients    If you would like to receive this communication electronically, please contact [email or web address] or [phone] to request more information on [facility] access.    For providers and/or their staff who would like to refer a patient to [facility], please contact us through our one-stop-shop provider referral line, [facility], at [phone].    If you feel you have received this communication in error or would no longer like to receive these types of communications, please e-mail [email or web address]

## 2020-09-24 NOTE — PROGRESS NOTES
Back and Spine Consult    Patient ID: Jessica Weclh is a 25 y.o. female.    Chief Complaint   Patient presents with    Neck Pain     She has neck pain that is constant and causes headaches.    Low-back Pain     She has had low back pain for years since the birth of her children, pain is constant, and has gotten worse in the last 3 to 3&1/2 months.    Numbness     She has had numbness in both buttocks that started in the last 2-3 weeks.       Review of Systems   Constitutional: Negative for activity change, appetite change, chills, fever and unexpected weight change.   HENT: Negative for tinnitus, trouble swallowing and voice change.    Respiratory: Negative for apnea, cough, chest tightness and shortness of breath.    Cardiovascular: Negative for chest pain and palpitations.   Gastrointestinal: Negative for constipation, diarrhea, nausea and vomiting.   Genitourinary: Negative for difficulty urinating, dysuria, frequency and urgency.   Musculoskeletal: Positive for back pain, myalgias, neck pain and neck stiffness. Negative for gait problem.   Skin: Negative for wound.   Neurological: Positive for numbness. Negative for dizziness, tremors, seizures, facial asymmetry, speech difficulty, weakness, light-headedness and headaches.   Psychiatric/Behavioral: Negative for confusion and decreased concentration.       Past Medical History:   Diagnosis Date    Alpha thalassemia minor 6/22/2016    Asthma - childhood 8/6/2019     Social History     Socioeconomic History    Marital status: Single     Spouse name: Not on file    Number of children: 2    Years of education: Not on file    Highest education level: Not on file   Occupational History    Occupation: medical assistant     Employer: [employer]     Comment: dermatology clinic at [employer]   Social Needs    Financial resource strain: Not on file    Food insecurity     Worry: Not on file     Inability: Not on file    Transportation needs      "Medical: Not on file     Non-medical: Not on file   Tobacco Use    Smoking status: Never Smoker    Smokeless tobacco: Never Used   Substance and Sexual Activity    Alcohol use: No    Drug use: No    Sexual activity: Yes     Partners: Male     Birth control/protection: None   Lifestyle    Physical activity     Days per week: Not on file     Minutes per session: Not on file    Stress: Not on file   Relationships    Social connections     Talks on phone: Not on file     Gets together: Not on file     Attends Yarsanism service: Not on file     Active member of club or organization: Not on file     Attends meetings of clubs or organizations: Not on file     Relationship status: Not on file   Other Topics Concern    Not on file   Social History Narrative    Not on file     Family History   Problem Relation Age of Onset    Schizophrenia Mother     Breast cancer Neg Hx     Colon cancer Neg Hx     Ovarian cancer Neg Hx      Review of patient's allergies indicates:  No Known Allergies    Current Outpatient Medications:     ferrous sulfate 325 (65 FE) MG EC tablet, Take 1 tablet (325 mg total) by mouth once daily. Take with Vitamin C or orange juice, Disp: 180 tablet, Rfl: 3    meloxicam (MOBIC) 15 MG tablet, Take 1 tablet (15 mg total) by mouth daily as needed for Pain. Take with food, Disp: 90 tablet, Rfl: 1    tiZANidine (ZANAFLEX) 4 MG tablet, Take 1 tablet (4 mg total) by mouth 3 (three) times daily as needed., Disp: 60 tablet, Rfl: 2    Vitals:    09/24/20 0823   BP: 116/79   BP Location: Left arm   Patient Position: Sitting   BP Method: Large (Automatic)   Pulse: 76   Weight: 104.2 kg (229 lb 11.5 oz)   Height: 5' 6" (1.676 m)       Physical Exam  Vitals signs and nursing note reviewed.   Constitutional:       Appearance: She is well-developed.   HENT:      Head: Normocephalic and atraumatic.   Eyes:      Pupils: Pupils are equal, round, and reactive to light.   Neck:      Musculoskeletal: Normal range " of motion and neck supple.   Cardiovascular:      Rate and Rhythm: Normal rate.   Pulmonary:      Effort: Pulmonary effort is normal.   Musculoskeletal: Normal range of motion.   Skin:     General: Skin is warm and dry.   Neurological:      Mental Status: She is alert and oriented to person, place, and time.      Coordination: Finger-Nose-Finger Test, Heel to Shin Test and Romberg Test normal.      Gait: Gait is intact. Tandem walk normal.      Deep Tendon Reflexes:      Reflex Scores:       Tricep reflexes are 2+ on the right side and 2+ on the left side.       Bicep reflexes are 2+ on the right side and 2+ on the left side.       Brachioradialis reflexes are 2+ on the right side and 2+ on the left side.       Patellar reflexes are 2+ on the right side and 2+ on the left side.       Achilles reflexes are 2+ on the right side and 2+ on the left side.  Psychiatric:         Speech: Speech normal.         Behavior: Behavior normal.         Thought Content: Thought content normal.         Judgment: Judgment normal.         Neurologic Exam     Mental Status   Oriented to person, place, and time.   Oriented to person.   Oriented to place.   Oriented to time.   Follows 3 step commands.   Attention: normal. Concentration: normal.   Speech: speech is normal   Level of consciousness: alert  Knowledge: consistent with education.   Able to name object. Able to read. Able to repeat. Able to write. Normal comprehension.     Cranial Nerves     CN II   Visual acuity: normal  Right visual field deficit: none  Left visual field deficit: none     CN III, IV, VI   Pupils are equal, round, and reactive to light.  Right pupil: Size: 3 mm. Shape: regular. Reactivity: brisk. Consensual response: intact.   Left pupil: Size: 3 mm. Shape: regular. Reactivity: brisk. Consensual response: intact.   CN III: no CN III palsy  CN VI: no CN VI palsy  Nystagmus: none   Diplopia: none  Ophthalmoparesis: none  Conjugate gaze: present    CN V   Right  facial sensation deficit: none  Left facial sensation deficit: none    CN VII   Right facial weakness: none  Left facial weakness: none    CN VIII   Hearing: intact    CN IX, X   CN IX normal.   CN X normal.     CN XI   Right sternocleidomastoid strength: normal  Left sternocleidomastoid strength: normal  Right trapezius strength: normal  Left trapezius strength: normal    CN XII   Fasciculations: absent  Tongue deviation: none    Motor Exam   Muscle bulk: normal  Overall muscle tone: normal  Right arm pronator drift: absent  Left arm pronator drift: absent    Strength   Right neck flexion: 5/5  Left neck flexion: 5/5  Right neck extension: 5/5  Left neck extension: 5/5  Right deltoid: 5/5  Left deltoid: 5/5  Right biceps: 5/5  Left biceps: 5/5  Right triceps: 5/5  Left triceps: 5/5  Right wrist flexion: 5/5  Left wrist flexion: 5/5  Right wrist extension: 5/5  Left wrist extension: 5/5  Right interossei: 5/5  Left interossei: 5/5  Right abdominals: 5/5  Left abdominals: 5/5  Right iliopsoas: 5/5  Left iliopsoas: 5/5  Right quadriceps: 5/5  Left quadriceps: 5/5  Right hamstrin/5  Left hamstrin/5  Right glutei: 5/5  Left glutei: 5/5  Right anterior tibial: 5/5  Left anterior tibial: 5/5  Right posterior tibial: 5/5  Left posterior tibial: 5/5  Right peroneal: 5/5  Left peroneal: 5/5  Right gastroc: 5/5  Left gastroc: 5/5    Sensory Exam   Right arm light touch: normal  Left arm light touch: normal  Right leg light touch: normal  Left leg light touch: normal  Right arm vibration: normal  Left arm vibration: normal  Right arm pinprick: normal  Left arm pinprick: normal    Gait, Coordination, and Reflexes     Gait  Gait: normal    Coordination   Romberg: negative  Finger to nose coordination: normal  Heel to shin coordination: normal  Tandem walking coordination: normal    Tremor   Resting tremor: absent  Intention tremor: absent  Action tremor: absent    Reflexes   Right brachioradialis: 2+  Left  brachioradialis: 2+  Right biceps: 2+  Left biceps: 2+  Right triceps: 2+  Left triceps: 2+  Right patellar: 2+  Left patellar: 2+  Right achilles: 2+  Left achilles: 2+  Right Topete: absent  Left Topete: absent  Right ankle clonus: absent  Left ankle clonus: absent      Provider dictation:  25 year old obese female is referred by Dr. Dominguez for evaluation of neck and back pain.  Back pain is greater than back pain.  Neck pain is been present for approximately 3 months and felt daily.  It is associated with headaches.  Denies radicular arm pain, numbness, tingling in upper extremities.  She also has a history of chronic lower back pain that is increased and pregnancy 1 year ago and especially over the last 3 months.  Pain is felt the midline lower lumbar region across the lower back bilaterally.  She experiences numbness intermittently in the bilateral buttocks.  Denies true radicular leg pain, tingling.  Lower back pain increases with lifting heavy objects.  For neck and back pain she has tried icy Hot, heating pad, up some salt soaks, Tylenol.  She was recently prescribed meloxicam and Zanaflex.  She has never had physical therapy or epidural steroid injections.  She is an [facility] employee and sits at her desk most of the day.    NDI:  40%.  Oswestry score: 20%.  PHQ:  13.    She is neurologically intact on exam with 5/5 strength, 2+ muscle stretch reflexes, and no sensory deficits in the upper and lower extremities.  Gait and station fluid.  Denies bowel bladder dysfunction.  Negative Topete's.  Some improvement in lower back pain with flexion, no change with extension.  No back pain elicited with facet loading.    She has not had any imaging.    In conclusion, Ms. Welch has chronic lower back pain that has increased and more acute onset posterior neck pain.  Neck and back pain both consistent with myofascial/mechanical pain.  She does not have any significant radicular symptoms nor did she have any  neurological deficits on exam. Recommend [facility] PT -to address neck pain prior to back pain as neck pain is her greater concern at this time.   Follow up in clinic if no improvement at which time, we can consider imaging/ injections.    The patient has had a history of neck / back pain with limitations in there activities of Daily living.  Previous treatment has not provided relief.    The situation was discussed at length with the patient.  We discussed different causes of neck/ back pain and different treatment options.  We discussed the importance of stretching and strengthening.  We discussed posture.  We discussed the pros and cons of further diagnostic testing, alternative treatment and Medications    Based on the history, physical exam, and functional index, an active physical therapy program is recommended.  The goal is to restore the patients function and reduce pain.  A program of progressive resistance exercises, biomechanical, and mobility maneuvers, instructions in proper body mechanics, aerobic conditioning and HEP will be utilized. The program will continue as long as making improvements.    An assessment of patients progress will be made at each visit to document change in status.    The patient will be actively involved in there own treatment, and responsible for appointments and home program    The patient's cervical/ lumbar isometric strength will be tested and they will be placed in a program of isolated strength training based on 50% of their total functional torque and advanced as clinically appropriate.      Directional preference of pain will further influence the patients active rehabilitation program    The patient was instructed there might be an initial increase in discomfort    They are enrolled in cervical program with good prognosis.  After cervical pain is addressed, may work on lumbar pain.      Visit Diagnosis:  Chronic bilateral low back pain without sciatica    Low back pain  of multiple sites of spine with sciatica  -     Ambulatory referral/consult to Back & Spine Clinic  -     Ambulatory referral/consult to [facility]; Future; Expected date: 10/01/2020    Neck pain  -     Ambulatory referral/consult to [facility]; Future; Expected date: 10/01/2020        Total time spent counseling greater than fifty percent of total visit time.  Counseling included discussion regarding imaging findings, diagnosis possibilities, treatment options, risks and benefits.   The patient had many questions regarding the options and long-term effects.

## 2020-09-24 NOTE — PROGRESS NOTES
Hello! We are asking you to complete following questionnaire prior to your upcoming virtual visit with Valeria Pride. This questionnaire has been designed to give us information on how your low back or leg pain has affected your ability to manage everyday life. Please respond with only ONE number answer to each question which best applies to you TODAY. This will need to be completed and sent back prior to checking in for your appointment.    EXAMPLE OF RESPONSE:  Q1: 2       Q2: 1      Q3: 4     Q1: Pain Intensity 4  0 - No pain  1 - Very Mild pain   2 - Moderate Pain  3 - Fairly Severe Pain  4 - Very Severe Pain  5 - Worst Imaginable Pain    Q2: Personal Care (washing, dressing, etc) 1  0 - I can look after myself normally without causing extra pain  1 - I can look after myself normally but it causes extra pain  2 - It is painful to look after myself and I am slow and careful  3 - I need some help but manage most of my personal care   4 - I need help everyday in most aspects of self-care   5 - I do not get dressed, I wash with difficulty and stay in bed    Q3: Lifting 1  0 - I can lift heavy weights without extra pain  1 - I can lift heavy weights but it gives extra pain   2 - Pain prevents me from lifting heavy weights off the floor, but I can manage if they are conveniently placed (eg. on a table)  3 - Pain prevents me from lifting heavy weights, but I can manage light to medium weights if they are conveniently positioned   4 - I can lift very light weights   5 - I cannot lift or carry anything at all     Q4: Walking 0  0 - Pain does not prevent me walking any distance   1 - Pain prevents me from walking more than 1 mile  2 - Pain prevents me from walking more than 1/2 mile  3 - Pain prevents me from walking more than 100 yards  4 - I can only walk using a stick or crutches  5 - I am in bed most of the time    Q5: Sitting 2  0 - I can sit in any chair as long as I like   1 - I can only sit in my favorite chair  as long as I like   2 - Pain prevents me from sitting for more than one hour   3 - Pain prevents me from sitting for more than 30 minutes   4 - Pain prevents me from sitting for more than 10 minutes   5 - Pain prevents me from sitting at all     Q6: Standing 0  0 - I can stand as long as I want without extra pain  1 - I can stand as long as I want but it gives me extra pain  2 - Pain prevents me from standing for more than 1 hour   3 - Pain prevents me from standing for more than 30 minutes   4 - Pain prevents me from standing for more than 10 minutes   5 - Pain prevents me from standing at all     Q7: Sleeping 1   0 - My sleep is never disturbed by pain   1 - My sleep is occasionally disturbed by pain   2 - Because of pain, I have less than 6 hours of sleep  3 - Because of pain, I have less than 4 hours of sleep  4 - Because of pain, I have less than 2 hours of sleep  5 - Pain prevents me from sleeping at all    Q8: Sex Life (if applicable) 0  0 - My sex life is normal and causes no extra pain  1 - My sex life is normal but causes some extra pain   2 - My sex life is nearly normal but is very painful   3 - My sex life is severely restricted by pain  4 - My sex life is nearly absent because of pain  5 - Pain prevents any sex life at all    Q9: Social Life 0  0 - My social life is normal and gives me no extra pain  1 - My social life is normal but increases the degree of pain  2 - Pain has no significant effect on my social life apart from limiting my more energetic interests such as sports   3 - Pain has restricted my social life and I do not go out as often   4 - Pain has restricted my social life to my house  5 - I have no social life because of pain    Q10: Traveling 1  0 - I can travel anywhere without pain  1 - I can travel anywhere but it gives me extra pain   2 - Pain is bad but I manage journeys over 2 hours   3 - Pain restricts me to journeys of less than 1 hour  4 - Pain restricts me to short necessary  journeys under 30 minutes   5 - Pain prevents me from traveling except to receive treatment

## 2020-09-28 ENCOUNTER — PATIENT MESSAGE (OUTPATIENT)
Dept: SPINE | Facility: CLINIC | Age: 25
End: 2020-09-28

## 2020-09-28 ENCOUNTER — CLINICAL SUPPORT (OUTPATIENT)
Dept: REHABILITATION | Facility: HOSPITAL | Age: 25
End: 2020-09-28
Payer: COMMERCIAL

## 2020-09-28 DIAGNOSIS — M54.2 NECK PAIN: ICD-10-CM

## 2020-09-28 DIAGNOSIS — M54.40 LOW BACK PAIN OF MULTIPLE SITES OF SPINE WITH SCIATICA: ICD-10-CM

## 2020-09-28 PROCEDURE — 97750 PHYSICAL PERFORMANCE TEST: CPT | Mod: 32,PO | Performed by: PHYSICAL THERAPIST

## 2020-09-28 NOTE — PLAN OF CARE
[facility] - PHYSICAL THERAPY EVALUATION     Name: Jessica Welch  Clinic Number: [id number]    Therapy Diagnosis:   Encounter Diagnoses   Name Primary?    Low back pain of multiple sites of spine with sciatica     Neck pain      Physician: Valeria Pride PA-C    Physician Orders: PT Eval and Treat   Medical Diagnosis from Referral: Neck pain  Evaluation Date: 9/28/2020  Authorization Period Expiration: 12/31/20  Plan of Care Expiration: 12/07/20  Reassessment Due: 10/28/20 or 10th visit   Visit # / Visits authorized: 1/ 20    Time In: 1:05 PM   Time Out: 2:13 PM   Total Billable Time: 68 minutes    Precautions: Standard    Pattern of pain determined: REP      Subjective   Date of onset: 2 months   History of current condition - Jessica reports: A history of back pain that started after she had her child. She started having neck pain after she started working at her current job. This requires her to do some lifting of patients and she also has to sit for long periods. Her pain is constant and she localizes it to the base of the neck. She denies any pain that radiates into the arms or numbness and tingling. She does report an onset of headaches with the neck pain that she localizes to the frontal region. Her back pain is constant and she has periods of numbness that started a couple of weeks ago.      Medical History:   Past Medical History:   Diagnosis Date    Alpha thalassemia minor 6/22/2016    Asthma - childhood 8/6/2019       Surgical History:   Jessica Welch  has no past surgical history on file.    Medications:   Jessica FLOWERS has a current medication list which includes the following prescription(s): ferrous sulfate, meloxicam, and tizanidine.    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  Where is your pain the worst? Neck   2.  Is your pain constant or intermittent? Constant   3.  Does bending forward make your typical pain worse? Yes   4.  Since the start of your neck pain, has there been a change in your bowel or bladder? None   5.  What can't you do now that you use to be able to do? Sit for long periods of time, drive for long periods       Pts goals: To decrease her pain       Red Flag Screening:   Cough  Sneeze  Strain: (--)  Bladder/ bowel: (--)  Falls: (--)  Night pain: (+)  Unexplained weight loss: (--)  General health: Good     OBJECTIVE   Postural examination/scapula alignment: Rounded shoulder, Head forward and Affected scapula abducted  Standing: reduced lumbar lordosis  Correction of posture: better with lumbar roll    Range of Motion - MOVEMENT LOSS    ROM Loss   Flexion within functional limits   Extension moderate loss   Side bending Right within functional limits   Side bending Left within functional limits   Rotation Right within functional limits   Rotation Left within functional limits   Protraction within functional limits   Retraction  major loss       Upper Extremity Strength  (R) UE  (L) UE    Shoulder flexion: 5/5 Shoulder flexion: 5/5   Shoulder Abduction: 5/5 Shoulder abduction: 5/5   Elbow flexion: 5/5 Elbow flexion: 5/5   Elbow extension: 5/5 Elbow extension: 5/5   Wrist flexion: 5/5 Wrist flexion: 5/5   Wrist extension: 5/5 Wrist extension: 5/5    5/5 : 5/5      NEUROLOGICAL SCREEN    Sensory deficit: Normal sensation all dermatomes     Special Tests:   Test Name  Testing Result   Compression (--)   Distraction (--)   Neural Tension Test (--)     Reflexes:    Left Right   Biceps  1+ 1+   Brachioradialis  1+ 1+       REPEATED TEST MOVEMENTS:   Repeated Retraction in Sitting  end range pain  worse  increased   Repeated Retraction in lying better  decreased        Baseline Isometric Testing on Med X equipment:  Testing administered by PT  Date of testin20  ROM 36-93 deg   Max Peak Torque 122    Min Peak Torque 66    Flex/Ext Ratio 1.8:1   % below normative data 48         GAIT:  Assistive Device used: none  Level of Assistance: independent  Patient displays the following gait deviations:  no gait deviations observed.       Treatment   Treatment Time In: 1:40 PM   Treatment Time Out: 2:13 PM   Total Treatment time separate from Evaluation: 30 minutes      Jessica FLOWERS received therapeutic exercises to develop/improve posture, lumbar/cervical ROM, strength and muscular endurance for 30 minutes including the following exercises:   Med x dynamic exercise and baseline IM testing    Written Home Exercises Provided: yes.  Exercises were reviewed and Jessica was able to demonstrate them prior to the end of the session.  Jessica demonstrated good  understanding of the education provided.     See EMR under Patient Instructions for exercises provided 2020.      Education provided:   - Patient received education regarding proper posture and body mechanics.  Patient was given top 10 tips handout which discusses posture seated, standing, lifting correctly, components of exercise, importance of nutrition and hydration, and importance of sleep.  - Alex williamson tried, recommended, and purchase information was provided.    - Patient received a handout regarding anticipated muscular soreness following the isometric test and strategies for management were reviewed with patient  including stretching, using ice and scheduled rest.   - Patient received education on the [facility] program, purpose of the isometric test, progression of neck strengthening as well as wellness approach and systemic strengthening.  Details of the program were discussed.  Reviewed that patient should feel support/pressure from med ex restraints but no pain or discomfort and patient expressed understanding.      Assessment   Jessica FLOWERS is a 25 y.o. female referred to [facility] with a medical diagnosis of Neck pain. Pt presents with constant cervical pain, intermittent headaches, impaired posture and limited cervical ROM. Her symptoms are consistent with mechanical cervical pain. 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 She will benefit from physical therapy treatment to assist in reducing pain and improving ADL abilities.     Pain Pattern: REP       Pt prognosis is Excellent.   Pt will benefit from skilled outpatient Physical Therapy to address the deficits stated above and in the chart below, provide pt/family education, and to maximize pt's level of independence.     Plan of care discussed with patient: Yes  Pt's spiritual, cultural and educational needs considered and patient is agreeable to the plan of care and goals as stated below:     Anticipated Barriers for therapy: Work schedule     PT Evaluation Completed? Yes    Medical necessity is demonstrated by the following problem list.    Pt presents with the following impairments:     History  Co-morbidities and personal factors that may impact the plan of care Co-morbidities:   high BMI    Personal Factors:   lifestyle     low   Examination  Body Structures and Functions, activity limitations and participation restrictions that may impact the plan of care Body Regions:   neck  back    Body Systems:    ROM    Participation Restrictions:       Activity limitations:   Learning and applying knowledge  no deficits    General Tasks and Commands  no  deficits    Communication  no deficits    Mobility  driving (bike, car, motorcycle)    Self care  no deficits    Domestic Life  no deficits    Interactions/Relationships  no deficits    Life Areas  no deficits    Community and Social Life  no deficits         low   Clinical Presentation stable and uncomplicated low   Decision Making/ Complexity Score: low       GOALS: Pt is in agreement with the following goals.    Short term goals: 6 weeks or 10 visits   1.  Pt will demonstrate increased cervical ROM as measured by med ex by 3 degrees from initial test which results in improved  ROM of neck for ease with ADLs and driving  2. Pt will demonstrate independence with reducing or controlling symptoms with ther ex, movement, or position independently, able to reduce pain 1-2 points on pain scale using strategies taught in therapy  3. Pt will demonstrate increased MedX average isometric strength value by 25% with  when compared to the initial testing resulting in improved ability to perform bending, lifting, and carrying activities safely, confidently.      Long term goals: 10 weeks or 20 visits  1. Pt will demonstratte increased cervical ROM as measured by med ex by 6 degrees from initial test which results in functional ROM of neck for ease with ADLs and driving  2. Pt will demonstrate increased MedX average isometric strength value  by 50% from initial test to improve ability to lift and carry, and sustain good posture while performing ADL's  3.Pt will demonstrate reduced pain and improved functional outcomes as reported on the Neck Disability Index by reaching a score of 20 or less in order to demonstrate subjective improvement in pt's condition.    4. Pt will demonstrate independence with reducing or controlling symptoms with ther ex, movement, or position independently, able to reduce pain 2-4 points on pain scale using strategies taught in therapy  5. Pt will demonstrate independence with the HEP at discharge.  "    Plan   Outpatient physical therapy 2x week for 10 weeks or 20 visits to include the following:   - Patient education  - Therapeutic exercise  - Manual therapy  - Performance testing   - Neuromuscular Re-education  - Therapeutic activity   - Modalities    Pt may be seen by PTA as part of the rehabilitation team.     Therapist: Sabino Hager, PT  9/28/2020      "I certify the need for these services furnished under this plan of treatment and while under my care."    ____________________________________  Physician/Referring Practitioner    _______________  Date of Signature        "

## 2020-09-29 ENCOUNTER — TELEPHONE (OUTPATIENT)
Dept: INTERNAL MEDICINE | Facility: CLINIC | Age: 25
End: 2020-09-29

## 2020-10-02 ENCOUNTER — CLINICAL SUPPORT (OUTPATIENT)
Dept: REHABILITATION | Facility: HOSPITAL | Age: 25
End: 2020-10-02
Payer: COMMERCIAL

## 2020-10-02 DIAGNOSIS — M54.2 NECK PAIN: ICD-10-CM

## 2020-10-02 PROCEDURE — 97750 PHYSICAL PERFORMANCE TEST: CPT | Mod: 32,PO

## 2020-10-02 NOTE — PROGRESS NOTES
[facility]      Name: Jessica FLOWERS [facility]  Clinic Number: [id number]    Therapy Diagnosis:   Encounter Diagnosis   Name Primary?    Neck pain      Physician: Valeria Pride PA-C    Visit Date: 10/2/2020  Physician Orders: PT Eval and Treat   Medical Diagnosis from Referral: Neck pain  Evaluation Date: 2020  Authorization Period Expiration: 20  Plan of Care Expiration: 20  Reassessment Due: 10/28/20 or 10th visit   Visit # / Visits authorized:      Time In: 9:15AM   Time Out:10:00AM   Total Billable Time: 45 minutes     Precautions: Standard     Pattern of pain determined: REP        Subjective   Jessica FLOWERS reports improvement of symptoms.  She states that she has been able to timothy cervical ext ex of HEP in sitting    Patient reports tolerating previous visit some minor mm soreness  Patient reports their pain to be 3/10 on a 0-10 scale with 0 being no pain and 10 being the worst pain imaginable.  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[employer]  Leisure: Spending time with kids         Pt goals: To decrease her pain        Objective     Baseline IM Testing Results:   Date of testin20  ROM 36-93 deg   Max Peak Torque 122    Min Peak Torque 66    Flex/Ext Ratio 1.8:1   % below normative data 48           Outcomes:  Initial score:48%  Visit 5 score:  Goal:      Treatment    Pt was instructed in and performed the following:     Jessica FLOWERS received therapeutic exercises to develop/improved posture, cardiovascular endurance, muscular endurance, lumbar/cervical ROM, strength and muscular endurance for 45 minutes including the following exercises:     UBE 3/3  UT stretch 3 x 20 sec  Lev Scap stretch 3 x 20 sec  Scap retraction 20x  Seated cervical ext with towel 10x  supine ios retraction 10x  supine ext 10x        Peripheral muscle strengthening which included 1 set of 15-20 repetitions at a slow, controlled  10-13 second per rep pace focused on strengthening supporting musculature for improved body mechanics and functional mobility.  Pt and therapist focused on proper form during treatment to ensure optimal strengthening of each targeted muscle group.  Machines were utilized including torso rotation, leg extension, leg curl, chest press, upright row. Tricep extension, bicep curl, leg press, and hip abduction added visit 3    [facility] 10/2/2020   Visit Number 2   VAS Pain Rating 3   Cervical Stretches - Retraction In Lying 10   Retraction in Sitting 10   Retraction with Extension 10   Scapular Retraction 20   Cervical Extension Seat Pad -   Seat Adjustment -   Top Dead Center -   Counterweight -   Cervical Flexion -   Cervical Extension -   Cervical Peak Torque -   Cervical Weight 60   Repetitions 15   Rating of Perceived Exertion 2         Home Exercises Provided and Patient Education Provided       Education provided:   - effects of therapy  -posture in sitting    Written Home Exercises Provided: Patient instructed to cont prior HEP.  Exercises were reviewed and Jessica was able to demonstrate them prior to the end of the session.  Jessica demonstrated good  understanding of the education provided.     See EMR under Patient Instructions for exercises provided prior visit.          Assessment     Jessica aguirre today's tx well w/o c/o neck pn sx. She had low PE with MedX. She did not complete LE peripheral machine portion of program today due to time.   Patient is making good progress towards established goals.  Pt will continue to benefit from skilled outpatient physical therapy to address the deficits stated in the impairment chart, provide pt/family education and to maximize pt's level of independence in the home and community environment.     Anticipated Barriers for therapy: Work schedule        Pt's spiritual, cultural and educational needs considered and pt agreeable to plan of care and goals as stated  below:         Goals:     Short term goals: 6 weeks or 10 visits   1.  Pt will demonstrate increased cervical ROM as measured by med ex by 3 degrees from initial test which results in improved  ROM of neck for ease with ADLs and driving  2. Pt will demonstrate independence with reducing or controlling symptoms with ther ex, movement, or position independently, able to reduce pain 1-2 points on pain scale using strategies taught in therapy  3. Pt will demonstrate increased MedX average isometric strength value by 25% with  when compared to the initial testing resulting in improved ability to perform bending, lifting, and carrying activities safely, confidently.        Long term goals: 10 weeks or 20 visits  1. Pt will demonstratte increased cervical ROM as measured by med ex by 6 degrees from initial test which results in functional ROM of neck for ease with ADLs and driving  2. Pt will demonstrate increased MedX average isometric strength value  by 50% from initial test to improve ability to lift and carry, and sustain good posture while performing ADL's  3.Pt will demonstrate reduced pain and improved functional outcomes as reported on the Neck Disability Index by reaching a score of 20 or less in order to demonstrate subjective improvement in pt's condition.    4. Pt will demonstrate independence with reducing or controlling symptoms with ther ex, movement, or position independently, able to reduce pain 2-4 points on pain scale using strategies taught in therapy  5. Pt will demonstrate independence with the HEP at discharge      Plan   Continue with established Plan of Care towards established PT goals.

## 2020-10-05 ENCOUNTER — CLINICAL SUPPORT (OUTPATIENT)
Dept: REHABILITATION | Facility: HOSPITAL | Age: 25
End: 2020-10-05
Payer: COMMERCIAL

## 2020-10-05 DIAGNOSIS — M54.2 NECK PAIN: ICD-10-CM

## 2020-10-05 PROCEDURE — 97750 PHYSICAL PERFORMANCE TEST: CPT | Mod: 32,PO

## 2020-10-09 ENCOUNTER — CLINICAL SUPPORT (OUTPATIENT)
Dept: REHABILITATION | Facility: HOSPITAL | Age: 25
End: 2020-10-09
Payer: COMMERCIAL

## 2020-10-09 DIAGNOSIS — M54.2 NECK PAIN: ICD-10-CM

## 2020-10-09 PROCEDURE — 97750 PHYSICAL PERFORMANCE TEST: CPT | Mod: 32,PO

## 2020-10-09 NOTE — PROGRESS NOTES
[facility]      Name: Jessica FLOWERS [facility]  Clinic Number: [id number]    Therapy Diagnosis:   Encounter Diagnosis   Name Primary?    Neck pain      Physician: Valeria Pride PA-C    Visit Date: 10/9/2020  Physician Orders: PT Eval and Treat   Medical Diagnosis from Referral: Neck pain  Evaluation Date: 2020  Authorization Period Expiration: 20  Plan of Care Expiration: 20  Reassessment Due: 10/28/20 or 10th visit   Visit # / Visits authorized: 3/ 20     Time In:8:3AM   Time Out 9:20AM   Total Billable Time: 45 minutes     Precautions: Standard     Pattern of pain determined: REP        Subjective   Jessica FLOWERS reports no pn after last tx. She i reports 3/10neck pn today    Patient reports tolerating previous visit no adverse effects  Patient reports their pain to be 3 /10 on a 0-10 scale with 0 being no pain and 10 being the worst pain imaginable.  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[employer]  Leisure: Spending time with kids         Pt goals: To decrease her pain        Objective     Baseline IM Testing Results:   Date of testin20  ROM 36-93 deg   Max Peak Torque 122    Min Peak Torque 66    Flex/Ext Ratio 1.8:1   % below normative data 48           Outcomes:  Initial score:48%  Visit 5 score:  Goal:      Treatment    Pt was instructed in and performed the following:     Jessica FLOWERS received therapeutic exercises to develop/improved posture, cardiovascular endurance, muscular endurance, lumbar/cervical ROM, strength and muscular endurance for 45 minutes including the following exercises:     UBE 3/3  UT stretch 3 x 20 sec  Lev Scap stretch 3 x 20 sec  Scap retraction 20x  Seated cervical ext with towel 10x  supine iso retraction 10x  supine ext 10x        Peripheral muscle strengthening which included 1 set of 15-20 repetitions at a slow, controlled 10-13 second per rep pace focused on strengthening  supporting musculature for improved body mechanics and functional mobility.  Pt and therapist focused on proper form during treatment to ensure optimal strengthening of each targeted muscle group.  Machines were utilized including torso rotation, leg extension, leg curl, chest press, upright row. Tricep extension, bicep curl, leg press, and hip abduction added visit 3    [facility] 10/9/2020   Visit Number 4   VAS Pain Rating 3   Cervical Stretches - Retraction In Lying 10   Retraction in Sitting 10   Retraction with Extension 10   Scapular Retraction 20   Cervical Extension Seat Pad -   Seat Adjustment -   Top Dead Center -   Counterweight -   Cervical Flexion -   Cervical Extension -   Cervical Peak Torque -   Cervical Weight 63   Repetitions 15   Rating of Perceived Exertion 3           Home Exercises Provided and Patient Education Provided       Education provided:   - effects of therapy  -posture in sitting    Written Home Exercises Provided: Patient instructed to cont prior HEP.  Exercises were reviewed and Jessica was able to demonstrate them prior to the end of the session.  Jessica demonstrated good  understanding of the education provided.     See EMR under Patient Instructions for exercises provided prior visit.          Assessment     Jessica timothy today's tx with progression of resistance  on MedX and with LE peripheral machines with low PE. She was not progressed with UE peripheral machines  today and reported a lower PE than last tx  and was w/o c/o neck pn sx.   Patient is making good progress towards established goals.  Pt will continue to benefit from skilled outpatient physical therapy to address the deficits stated in the impairment chart, provide pt/family education and to maximize pt's level of independence in the home and community environment.     Anticipated Barriers for therapy: Work schedule        Pt's spiritual, cultural and educational needs considered and pt agreeable to plan  of care and goals as stated below:         Goals:     Short term goals: 6 weeks or 10 visits   1.  Pt will demonstrate increased cervical ROM as measured by med ex by 3 degrees from initial test which results in improved  ROM of neck for ease with ADLs and driving  2. Pt will demonstrate independence with reducing or controlling symptoms with ther ex, movement, or position independently, able to reduce pain 1-2 points on pain scale using strategies taught in therapy  3. Pt will demonstrate increased MedX average isometric strength value by 25% with  when compared to the initial testing resulting in improved ability to perform bending, lifting, and carrying activities safely, confidently.        Long term goals: 10 weeks or 20 visits  1. Pt will demonstratte increased cervical ROM as measured by med ex by 6 degrees from initial test which results in functional ROM of neck for ease with ADLs and driving  2. Pt will demonstrate increased MedX average isometric strength value  by 50% from initial test to improve ability to lift and carry, and sustain good posture while performing ADL's  3.Pt will demonstrate reduced pain and improved functional outcomes as reported on the Neck Disability Index by reaching a score of 20 or less in order to demonstrate subjective improvement in pt's condition.    4. Pt will demonstrate independence with reducing or controlling symptoms with ther ex, movement, or position independently, able to reduce pain 2-4 points on pain scale using strategies taught in therapy  5. Pt will demonstrate independence with the HEP at discharge      Plan   Continue with established Plan of Care towards established PT goals.

## 2020-10-12 ENCOUNTER — CLINICAL SUPPORT (OUTPATIENT)
Dept: REHABILITATION | Facility: HOSPITAL | Age: 25
End: 2020-10-12
Payer: COMMERCIAL

## 2020-10-12 DIAGNOSIS — M54.2 NECK PAIN: ICD-10-CM

## 2020-10-12 PROCEDURE — 97750 PHYSICAL PERFORMANCE TEST: CPT | Mod: 32,PO

## 2020-10-12 NOTE — PROGRESS NOTES
[facility]      Name: Jessica FLOWERS [facility]  Clinic Number: [id number]    Therapy Diagnosis:   Encounter Diagnosis   Name Primary?    Neck pain      Physician: Valeria Pride PA-C    Visit Date: 10/12/2020  Physician Orders: PT Eval and Treat   Medical Diagnosis from Referral: Neck pain  Evaluation Date: 2020  Authorization Period Expiration: 20  Plan of Care Expiration: 20  Reassessment Due: 10/28/20 or 10th visit   Visit # / Visits authorized:      Time In:8:30AM   Time Out 9:20AM   Total Billable Time: 45 minutes     Precautions: Standard     Pattern of pain determined: REP        Subjective   Jessica FLOWERS reports  5/10 neck pn today    Patient reports tolerating previous visit no adverse effects  Patient reports their pain to be 3 /10 on a 0-10 scale with 0 being no pain and 10 being the worst pain imaginable.  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[employer]  Leisure: Spending time with kids         Pt goals: To decrease her pain        Objective     Baseline IM Testing Results:   Date of testin20  ROM 36-93 deg   Max Peak Torque 122    Min Peak Torque 66    Flex/Ext Ratio 1.8:1   % below normative data 48           Outcomes:  Initial score:48%  Visit 5 score:  Goal:      Treatment    Pt was instructed in and performed the following:     Jessica FLOWERS received therapeutic exercises to develop/improved posture, cardiovascular endurance, muscular endurance, lumbar/cervical ROM, strength and muscular endurance for 45 minutes including the following exercises:     UBE 3/3  UT stretch 3 x 20 sec  Lev Scap stretch 3 x 20 sec  Scap retraction 20x  Seated cervical ext with towel 10x  supine iso retraction 10x  supine ext 10x        Peripheral muscle strengthening which included 1 set of 15-20 repetitions at a slow, controlled 10-13 second per rep pace focused on strengthening supporting musculature for  improved body mechanics and functional mobility.  Pt and therapist focused on proper form during treatment to ensure optimal strengthening of each targeted muscle group.  Machines were utilized including torso rotation, leg extension, leg curl, chest press, upright row. Tricep extension, bicep curl, leg press, and hip abduction added visit 3    [facility] 10/12/2020   Visit Number 5   VAS Pain Rating 5   Cervical Stretches - Retraction In Lying 10   Retraction in Sitting 10   Retraction with Extension 10   Scapular Retraction 20   Cervical Extension Seat Pad -   Seat Adjustment -   Top Dead Center -   Counterweight -   Cervical Flexion -   Cervical Extension -   Cervical Peak Torque -   Cervical Weight 63   Repetitions 20   Rating of Perceived Exertion 3           Home Exercises Provided and Patient Education Provided       Education provided:   - effects of therapy  -posture in sitting    Written Home Exercises Provided: Patient instructed to cont prior HEP.  Exercises were reviewed and Jessica was able to demonstrate them prior to the end of the session.  Jessica demonstrated good  understanding of the education provided.     See EMR under Patient Instructions for exercises provided prior visit.          Assessment     Jessica timothy today's tx with progression of reps  on MedX and with  peripheral machines well with low PE. She was w/o c/o neck pn sx throughout.    Patient is making good progress towards established goals.  Pt will continue to benefit from skilled outpatient physical therapy to address the deficits stated in the impairment chart, provide pt/family education and to maximize pt's level of independence in the home and community environment.     Anticipated Barriers for therapy: Work schedule        Pt's spiritual, cultural and educational needs considered and pt agreeable to plan of care and goals as stated below:         Goals:     Short term goals: 6 weeks or 10 visits   1.  Pt will  demonstrate increased cervical ROM as measured by med ex by 3 degrees from initial test which results in improved  ROM of neck for ease with ADLs and driving  2. Pt will demonstrate independence with reducing or controlling symptoms with ther ex, movement, or position independently, able to reduce pain 1-2 points on pain scale using strategies taught in therapy  3. Pt will demonstrate increased MedX average isometric strength value by 25% with  when compared to the initial testing resulting in improved ability to perform bending, lifting, and carrying activities safely, confidently.        Long term goals: 10 weeks or 20 visits  1. Pt will demonstratte increased cervical ROM as measured by med ex by 6 degrees from initial test which results in functional ROM of neck for ease with ADLs and driving  2. Pt will demonstrate increased MedX average isometric strength value  by 50% from initial test to improve ability to lift and carry, and sustain good posture while performing ADL's  3.Pt will demonstrate reduced pain and improved functional outcomes as reported on the Neck Disability Index by reaching a score of 20 or less in order to demonstrate subjective improvement in pt's condition.    4. Pt will demonstrate independence with reducing or controlling symptoms with ther ex, movement, or position independently, able to reduce pain 2-4 points on pain scale using strategies taught in therapy  5. Pt will demonstrate independence with the HEP at discharge      Plan   Continue with established Plan of Care towards established PT goals.

## 2020-10-15 ENCOUNTER — CLINICAL SUPPORT (OUTPATIENT)
Dept: REHABILITATION | Facility: HOSPITAL | Age: 25
End: 2020-10-15
Payer: COMMERCIAL

## 2020-10-15 DIAGNOSIS — M54.2 NECK PAIN: ICD-10-CM

## 2020-10-15 PROCEDURE — 97750 PHYSICAL PERFORMANCE TEST: CPT | Mod: 32,PO

## 2020-10-15 NOTE — PROGRESS NOTES
[facility]      Name: Jessica FLOWERS [facility]  Clinic Number: [id number]    Therapy Diagnosis:   Encounter Diagnosis   Name Primary?    Neck pain      Physician: Valeria Pride PA-C    Visit Date: 10/15/2020  Physician Orders: PT Eval and Treat   Medical Diagnosis from Referral: Neck pain  Evaluation Date: 2020  Authorization Period Expiration: 20  Plan of Care Expiration: 20  Reassessment Due: 10/28/20 or 10th visit   Visit # / Visits authorized:      Time In:8:15AM   Time Out 9:00AM   Total Billable Time: 45 minutes     Precautions: Standard     Pattern of pain determined: REP        Subjective   Jessica FLOWERS reports  that she is w/o c/o neck pn today    Patient reports tolerating previous visit no adverse effects  Patient reports their pain to be 3 /10 on a 0-10 scale with 0 being no pain and 10 being the worst pain imaginable.  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[employer]  Leisure: Spending time with kids         Pt goals: To decrease her pain        Objective     Baseline IM Testing Results:   Date of testin20  ROM 36-93 deg   Max Peak Torque 122    Min Peak Torque 66    Flex/Ext Ratio 1.8:1   % below normative data 48           Outcomes:  Initial score:48%  Visit 5 score:  Goal:      Treatment    Pt was instructed in and performed the following:     Jessica FLOWERS received therapeutic exercises to develop/improved posture, cardiovascular endurance, muscular endurance, lumbar/cervical ROM, strength and muscular endurance for 45 minutes including the following exercises:     UBE 3/3  UT stretch 3 x 20 sec  Lev Scap stretch 3 x 20 sec  Scap retraction 20x  Seated cervical ext with towel 10x  supine iso retraction 10x  supine ext 10x        Peripheral muscle strengthening which included 1 set of 15-20 repetitions at a slow, controlled 10-13 second per rep pace focused on strengthening supporting  musculature for improved body mechanics and functional mobility.  Pt and therapist focused on proper form during treatment to ensure optimal strengthening of each targeted muscle group.  Machines were utilized including torso rotation, leg extension, leg curl, chest press, upright row. Tricep extension, bicep curl, leg press, and hip abduction added visit 3      [facility] 10/15/2020   Visit Number 6   VAS Pain Rating 0   Cervical Stretches - Retraction In Lying 10   Retraction in Sitting 10   Retraction with Extension 10   Scapular Retraction 20   Cervical Extension Seat Pad -   Seat Adjustment -   Top Dead Center -   Counterweight -   Cervical Flexion -   Cervical Extension -   Cervical Peak Torque -   Cervical Weight 66   Repetitions 15   Rating of Perceived Exertion 2         Home Exercises Provided and Patient Education Provided       Education provided:   - effects of therapy  -posture in sitting    Written Home Exercises Provided: Patient instructed to cont prior HEP.  Exercises were reviewed and Jessica was able to demonstrate them prior to the end of the session.  Jessica demonstrated good  understanding of the education provided.     See EMR under Patient Instructions for exercises provided prior visit.          Assessment     Jessica timothy today's tx with progression of resistance  on MedX and with  peripheral machines well with low PE again today. She was w/o c/o neck pn sx throughout tx and post tx.  She demonstrating improved strength with all activities.   Patient is making good progress towards established goals.  Pt will continue to benefit from skilled outpatient physical therapy to address the deficits stated in the impairment chart, provide pt/family education and to maximize pt's level of independence in the home and community environment.     Anticipated Barriers for therapy: Work schedule        Pt's spiritual, cultural and educational needs considered and pt agreeable to plan of  care and goals as stated below:         Goals:     Short term goals: 6 weeks or 10 visits   1.  Pt will demonstrate increased cervical ROM as measured by med ex by 3 degrees from initial test which results in improved  ROM of neck for ease with ADLs and driving  2. Pt will demonstrate independence with reducing or controlling symptoms with ther ex, movement, or position independently, able to reduce pain 1-2 points on pain scale using strategies taught in therapy  3. Pt will demonstrate increased MedX average isometric strength value by 25% with  when compared to the initial testing resulting in improved ability to perform bending, lifting, and carrying activities safely, confidently.        Long term goals: 10 weeks or 20 visits  1. Pt will demonstratte increased cervical ROM as measured by med ex by 6 degrees from initial test which results in functional ROM of neck for ease with ADLs and driving  2. Pt will demonstrate increased MedX average isometric strength value  by 50% from initial test to improve ability to lift and carry, and sustain good posture while performing ADL's  3.Pt will demonstrate reduced pain and improved functional outcomes as reported on the Neck Disability Index by reaching a score of 20 or less in order to demonstrate subjective improvement in pt's condition.    4. Pt will demonstrate independence with reducing or controlling symptoms with ther ex, movement, or position independently, able to reduce pain 2-4 points on pain scale using strategies taught in therapy  5. Pt will demonstrate independence with the HEP at discharge      Plan   Continue with established Plan of Care towards established PT goals.

## 2020-10-16 ENCOUNTER — TELEPHONE (OUTPATIENT)
Dept: SPINE | Facility: CLINIC | Age: 25
End: 2020-10-16

## 2020-10-19 ENCOUNTER — PATIENT MESSAGE (OUTPATIENT)
Dept: SPINE | Facility: CLINIC | Age: 25
End: 2020-10-19

## 2020-10-19 NOTE — TELEPHONE ENCOUNTER
Called patient to let her know Valeria Pride received her paperwork and that Valeria needs to know if she needs intermittent leave or continuous leave, left return message.

## 2020-10-19 NOTE — TELEPHONE ENCOUNTER
Jose Rafael -     We discussed this Friday.  Please let her know that we did recive the paperwork.  I need to know how she would like the paperwork completed - intermittent leave to allow time for therapy (which is what I recommend) or is she asking for continuous leave?    Thanks -   Valeria

## 2020-10-20 ENCOUNTER — CLINICAL SUPPORT (OUTPATIENT)
Dept: REHABILITATION | Facility: HOSPITAL | Age: 25
End: 2020-10-20
Payer: COMMERCIAL

## 2020-10-20 DIAGNOSIS — M54.2 NECK PAIN: ICD-10-CM

## 2020-10-20 PROCEDURE — 97750 PHYSICAL PERFORMANCE TEST: CPT | Mod: 32,PO | Performed by: PHYSICAL THERAPIST

## 2020-10-20 NOTE — PROGRESS NOTES
[facility]      Name: Jessica Welch  Clinic Number: [id number]    Therapy Diagnosis:   Encounter Diagnosis   Name Primary?    Neck pain      Physician: Valeria Pride PA-C    Visit Date: 10/20/2020  Physician Orders: PT Eval and Treat   Medical Diagnosis from Referral: Neck pain  Evaluation Date: 2020  Authorization Period Expiration: 20  Plan of Care Expiration: 20  Reassessment Due: 10/28/20 or 10th visit   Visit # / Visits authorized:      Time In:8:03 AM   Time Out 8:53AM   Total Billable Time: 45 minutes     Precautions: Standard     Pattern of pain determined: REP    Subjective   Jessica FLOWERS reports  That she has been getting some relief since she started the healthy [facility] program     Patient reports tolerating previous visit no adverse effects  Patient reports their pain to be 2/10 on a 0-10 scale with 0 being no pain and 10 being the worst pain imaginable.  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[employer]  Leisure: Spending time with kids         Pt goals: To decrease her pain        Objective     Baseline IM Testing Results:   Date of testin20  ROM 36-93 deg   Max Peak Torque 122    Min Peak Torque 66    Flex/Ext Ratio 1.8:1   % below normative data 48       Outcomes:  Initial score:48%  Visit 5 score:  Goal:      Treatment    Pt was instructed in and performed the following:     Jessica FLOWERS received therapeutic exercises to develop/improved posture, cardiovascular endurance, muscular endurance, lumbar/cervical ROM, strength and muscular endurance for 45 minutes including the following exercises:     UBE 3/3  UT stretch 3 x 20 sec  Lev Scap stretch 3 x 20 sec  Scap retraction 20x  Seated cervical ext with towel 10x  supine iso retraction 10x  supine ext 10x    Peripheral muscle strengthening which included 1 set of 15-20 repetitions at a slow, controlled 10-13 second per rep pace focused  on strengthening supporting musculature for improved body mechanics and functional mobility.  Pt and therapist focused on proper form during treatment to ensure optimal strengthening of each targeted muscle group.  Machines were utilized including torso rotation, leg extension, leg curl, chest press, upright row. Tricep extension, bicep curl, leg press, and hip abduction added visit 3    [facility] 10/20/2020   Visit Number 7   VAS Pain Rating 2   Cervical Stretches - Retraction In Lying -   Retraction in Sitting -   Retraction with Extension -   Scapular Retraction -   Cervical Extension Seat Pad -   Seat Adjustment -   Top Dead Center -   Counterweight -   Cervical Flexion -   Cervical Extension -   Cervical Peak Torque -   Cervical Weight 69   Repetitions 20   Rating of Perceived Exertion 2           Home Exercises Provided and Patient Education Provided       Education provided:   - effects of therapy  -posture in sitting    Written Home Exercises Provided: Patient instructed to cont prior HEP.  Exercises were reviewed and Jessica was able to demonstrate them prior to the end of the session.  Jessica demonstrated good  understanding of the education provided.     See EMR under Patient Instructions for exercises provided prior visit.          Assessment     Jessica was able to increase resistance on on activities today. Pain levels reduced since onset of program   Patient is making good progress towards established goals.  Pt will continue to benefit from skilled outpatient physical therapy to address the deficits stated in the impairment chart, provide pt/family education and to maximize pt's level of independence in the home and community environment.     Anticipated Barriers for therapy: Work schedule        Pt's spiritual, cultural and educational needs considered and pt agreeable to plan of care and goals as stated below:         Goals:     Short term goals: 6 weeks or 10 visits   1.  Pt will  demonstrate increased cervical ROM as measured by med ex by 3 degrees from initial test which results in improved  ROM of neck for ease with ADLs and driving  2. Pt will demonstrate independence with reducing or controlling symptoms with ther ex, movement, or position independently, able to reduce pain 1-2 points on pain scale using strategies taught in therapy  3. Pt will demonstrate increased MedX average isometric strength value by 25% with  when compared to the initial testing resulting in improved ability to perform bending, lifting, and carrying activities safely, confidently.        Long term goals: 10 weeks or 20 visits  1. Pt will demonstratte increased cervical ROM as measured by med ex by 6 degrees from initial test which results in functional ROM of neck for ease with ADLs and driving  2. Pt will demonstrate increased MedX average isometric strength value  by 50% from initial test to improve ability to lift and carry, and sustain good posture while performing ADL's  3.Pt will demonstrate reduced pain and improved functional outcomes as reported on the Neck Disability Index by reaching a score of 20 or less in order to demonstrate subjective improvement in pt's condition.    4. Pt will demonstrate independence with reducing or controlling symptoms with ther ex, movement, or position independently, able to reduce pain 2-4 points on pain scale using strategies taught in therapy  5. Pt will demonstrate independence with the HEP at discharge      Plan   Continue with established Plan of Care towards established PT goals.

## 2020-10-21 ENCOUNTER — PATIENT MESSAGE (OUTPATIENT)
Dept: SPINE | Facility: CLINIC | Age: 25
End: 2020-10-21

## 2021-05-06 ENCOUNTER — PATIENT MESSAGE (OUTPATIENT)
Dept: RESEARCH | Facility: HOSPITAL | Age: 26
End: 2021-05-06

## 2021-07-09 ENCOUNTER — CLINICAL SUPPORT (OUTPATIENT)
Dept: URGENT CARE | Facility: CLINIC | Age: 26
End: 2021-07-09
Payer: MEDICAID

## 2021-07-09 DIAGNOSIS — U07.1 COVID-19 VIRUS DETECTED: ICD-10-CM

## 2021-07-09 DIAGNOSIS — Z20.822 ENCOUNTER FOR LABORATORY TESTING FOR COVID-19 VIRUS: Primary | ICD-10-CM

## 2021-07-09 LAB
CTP QC/QA: YES
SARS-COV-2 RDRP RESP QL NAA+PROBE: POSITIVE

## 2021-07-09 PROCEDURE — 87635: ICD-10-PCS | Mod: QW,S$GLB,, | Performed by: FAMILY MEDICINE

## 2021-07-09 PROCEDURE — 87635 SARS-COV-2 COVID-19 AMP PRB: CPT | Mod: QW,S$GLB,, | Performed by: FAMILY MEDICINE

## 2021-07-09 PROCEDURE — 99211 OFF/OP EST MAY X REQ PHY/QHP: CPT | Mod: S$GLB,CS,, | Performed by: FAMILY MEDICINE

## 2021-07-09 PROCEDURE — 99211 PR OFFICE/OUTPT VISIT, EST, LEVL I: ICD-10-PCS | Mod: S$GLB,CS,, | Performed by: FAMILY MEDICINE

## 2021-07-10 ENCOUNTER — NURSE TRIAGE (OUTPATIENT)
Dept: ADMINISTRATIVE | Facility: CLINIC | Age: 26
End: 2021-07-10

## 2021-07-10 ENCOUNTER — TELEPHONE (OUTPATIENT)
Dept: URGENT CARE | Facility: CLINIC | Age: 26
End: 2021-07-10

## 2021-07-10 RX ORDER — ALBUTEROL SULFATE 90 UG/1
2 AEROSOL, METERED RESPIRATORY (INHALATION) EVERY 6 HOURS PRN
Qty: 18 G | Refills: 1 | Status: SHIPPED | OUTPATIENT
Start: 2021-07-10

## 2021-12-16 ENCOUNTER — OFFICE VISIT (OUTPATIENT)
Dept: URGENT CARE | Facility: CLINIC | Age: 26
End: 2021-12-16
Payer: MEDICAID

## 2021-12-16 VITALS
TEMPERATURE: 98 F | SYSTOLIC BLOOD PRESSURE: 128 MMHG | RESPIRATION RATE: 16 BRPM | OXYGEN SATURATION: 98 % | HEART RATE: 79 BPM | BODY MASS INDEX: 36.8 KG/M2 | HEIGHT: 66 IN | WEIGHT: 229 LBS | DIASTOLIC BLOOD PRESSURE: 80 MMHG

## 2021-12-16 DIAGNOSIS — R05.9 COUGH: Primary | ICD-10-CM

## 2021-12-16 DIAGNOSIS — J06.9 UPPER RESPIRATORY TRACT INFECTION, UNSPECIFIED TYPE: ICD-10-CM

## 2021-12-16 LAB
CTP QC/QA: YES
CTP QC/QA: YES
POC MOLECULAR INFLUENZA A AGN: NEGATIVE
POC MOLECULAR INFLUENZA B AGN: NEGATIVE
SARS-COV-2 RDRP RESP QL NAA+PROBE: NEGATIVE

## 2021-12-16 PROCEDURE — U0002 COVID-19 LAB TEST NON-CDC: HCPCS | Mod: QW,S$GLB,, | Performed by: FAMILY MEDICINE

## 2021-12-16 PROCEDURE — 99214 PR OFFICE/OUTPT VISIT, EST, LEVL IV, 30-39 MIN: ICD-10-PCS | Mod: S$GLB,,, | Performed by: FAMILY MEDICINE

## 2021-12-16 PROCEDURE — 87502 POCT INFLUENZA A/B MOLECULAR: ICD-10-PCS | Mod: QW,S$GLB,, | Performed by: FAMILY MEDICINE

## 2021-12-16 PROCEDURE — U0002: ICD-10-PCS | Mod: QW,S$GLB,, | Performed by: FAMILY MEDICINE

## 2021-12-16 PROCEDURE — 99214 OFFICE O/P EST MOD 30 MIN: CPT | Mod: S$GLB,,, | Performed by: FAMILY MEDICINE

## 2021-12-16 PROCEDURE — 87502 INFLUENZA DNA AMP PROBE: CPT | Mod: QW,S$GLB,, | Performed by: FAMILY MEDICINE

## 2021-12-16 RX ORDER — BENZONATATE 100 MG/1
100 CAPSULE ORAL EVERY 6 HOURS PRN
Qty: 30 CAPSULE | Refills: 1 | Status: SHIPPED | OUTPATIENT
Start: 2021-12-16 | End: 2022-12-16

## 2021-12-16 RX ORDER — ALBUTEROL SULFATE 90 UG/1
2 AEROSOL, METERED RESPIRATORY (INHALATION) EVERY 6 HOURS PRN
Qty: 18 G | Refills: 2 | Status: SHIPPED | OUTPATIENT
Start: 2021-12-16

## 2021-12-16 RX ORDER — PROMETHAZINE HYDROCHLORIDE 6.25 MG/5ML
SYRUP ORAL
Qty: 120 ML | Refills: 1 | Status: SHIPPED | OUTPATIENT
Start: 2021-12-16

## 2024-04-15 NOTE — DISCHARGE SUMMARY
Discharge Note    Admit Date: 2019    Attending Physician: Jaqueline Figueroa MD     Discharge Physician: Jaqueline Figueroa MD    Final Diagnosis:     Disposition: Home or Self Care     Course: 25 y/o  at 40 weeks presented in active labor.  Delivery and postpartum course were uneventful  Patient Instructions:   Current Discharge Medication List      START taking these medications    Details   ibuprofen (ADVIL,MOTRIN) 600 MG tablet Take 1 tablet (600 mg total) by mouth every 6 (six) hours as needed.  Qty: 30 tablet, Refills: 0      oxyCODONE-acetaminophen (PERCOCET) 5-325 mg per tablet Take 1 tablet by mouth every 4 (four) hours as needed.  Qty: 20 tablet, Refills: 0    Comments: Quantity prescribed more than 7 day supply? No         CONTINUE these medications which have NOT CHANGED    Details   ferrous gluconate (FERGON) 324 MG tablet Take 1 tablet (324 mg total) by mouth daily with breakfast.  Qty: 30 tablet, Refills: 11    Associated Diagnoses: Encounter for supervision of other normal pregnancy in second trimester      prenatal vit,barbara 74-iron-folic 27 mg iron- 1 mg Tab Take 1 tablet by mouth once daily.  Qty: 30 tablet, Refills: 11    Associated Diagnoses: Encounter for supervision of other normal pregnancy in second trimester         STOP taking these medications       fluticasone propionate (FLONASE) 50 mcg/actuation nasal spray Comments:   Reason for Stopping:         promethazine (PHENERGAN) 12.5 MG Tab Comments:   Reason for Stopping:         ranitidine (ZANTAC) 300 MG tablet Comments:   Reason for Stopping:               Discharge Procedure Orders (must include Diet, Follow-up, Activity)  No discharge procedures on file.     Diet: Regular    Activity: Pelvic rest    Follow up: 6 week    Discharge Date: No discharge date for patient encounter.     Resulted